# Patient Record
Sex: MALE | Race: WHITE | URBAN - METROPOLITAN AREA
[De-identification: names, ages, dates, MRNs, and addresses within clinical notes are randomized per-mention and may not be internally consistent; named-entity substitution may affect disease eponyms.]

---

## 2017-04-28 ENCOUNTER — INPATIENT (INPATIENT)
Facility: HOSPITAL | Age: 82
LOS: 2 days | Discharge: HOME HEALTH | End: 2017-05-01
Attending: SURGERY

## 2017-05-23 ENCOUNTER — APPOINTMENT (OUTPATIENT)
Dept: SURGERY | Facility: CLINIC | Age: 82
End: 2017-05-23

## 2017-06-28 DIAGNOSIS — R06.89 OTHER ABNORMALITIES OF BREATHING: ICD-10-CM

## 2017-06-28 DIAGNOSIS — S22.41XA MULTIPLE FRACTURES OF RIBS, RIGHT SIDE, INITIAL ENCOUNTER FOR CLOSED FRACTURE: ICD-10-CM

## 2017-06-28 DIAGNOSIS — I25.10 ATHEROSCLEROTIC HEART DISEASE OF NATIVE CORONARY ARTERY WITHOUT ANGINA PECTORIS: ICD-10-CM
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
## 2017-06-28 DIAGNOSIS — E78.5 HYPERLIPIDEMIA, UNSPECIFIED: ICD-10-CM

## 2017-06-28 DIAGNOSIS — N40.0 BENIGN PROSTATIC HYPERPLASIA WITHOUT LOWER URINARY TRACT SYMPTOMS: ICD-10-CM

## 2017-06-28 DIAGNOSIS — Z95.5 PRESENCE OF CORONARY ANGIOPLASTY IMPLANT AND GRAFT: ICD-10-CM
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
## 2017-06-28 DIAGNOSIS — I10 ESSENTIAL (PRIMARY) HYPERTENSION: ICD-10-CM
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
## 2017-06-28 DIAGNOSIS — Z85.21 PERSONAL HISTORY OF MALIGNANT NEOPLASM OF LARYNX: ICD-10-CM

## 2017-06-28 DIAGNOSIS — S22.49XA MULTIPLE FRACTURES OF RIBS, UNSPECIFIED SIDE, INITIAL ENCOUNTER FOR CLOSED FRACTURE: ICD-10-CM

## 2017-06-28 DIAGNOSIS — R00.1 BRADYCARDIA, UNSPECIFIED: ICD-10-CM

## 2017-12-29 ENCOUNTER — EMERGENCY (EMERGENCY)
Facility: HOSPITAL | Age: 82
LOS: 1 days | Discharge: LEFT BEFORE DISCHARGE | End: 2017-12-29

## 2017-12-29 DIAGNOSIS — R05 COUGH: ICD-10-CM

## 2017-12-29 DIAGNOSIS — R09.89 OTHER SPECIFIED SYMPTOMS AND SIGNS INVOLVING THE CIRCULATORY AND RESPIRATORY SYSTEMS: ICD-10-CM

## 2017-12-30 ENCOUNTER — TRANSCRIPTION ENCOUNTER (OUTPATIENT)
Age: 82
End: 2017-12-30

## 2019-09-04 ENCOUNTER — APPOINTMENT (OUTPATIENT)
Dept: CARDIOLOGY | Facility: CLINIC | Age: 84
End: 2019-09-04
Payer: MEDICARE

## 2019-09-04 PROCEDURE — 99214 OFFICE O/P EST MOD 30 MIN: CPT | Mod: 25

## 2019-09-04 PROCEDURE — 93000 ELECTROCARDIOGRAM COMPLETE: CPT

## 2020-03-11 ENCOUNTER — APPOINTMENT (OUTPATIENT)
Dept: CARDIOLOGY | Facility: CLINIC | Age: 85
End: 2020-03-11
Payer: MEDICARE

## 2020-03-11 PROCEDURE — 93000 ELECTROCARDIOGRAM COMPLETE: CPT

## 2020-03-11 PROCEDURE — 99214 OFFICE O/P EST MOD 30 MIN: CPT

## 2020-08-31 ENCOUNTER — RECORD ABSTRACTING (OUTPATIENT)
Age: 85
End: 2020-08-31

## 2020-08-31 DIAGNOSIS — Z87.891 PERSONAL HISTORY OF NICOTINE DEPENDENCE: ICD-10-CM

## 2020-08-31 DIAGNOSIS — I34.9 NONRHEUMATIC MITRAL VALVE DISORDER, UNSPECIFIED: ICD-10-CM

## 2020-08-31 DIAGNOSIS — I44.30 UNSPECIFIED ATRIOVENTRICULAR BLOCK: ICD-10-CM

## 2020-08-31 DIAGNOSIS — Z87.898 PERSONAL HISTORY OF OTHER SPECIFIED CONDITIONS: ICD-10-CM

## 2020-08-31 DIAGNOSIS — E11.9 TYPE 2 DIABETES MELLITUS W/OUT COMPLICATIONS: ICD-10-CM

## 2020-08-31 RX ORDER — GLIPIZIDE 2.5 MG/1
2.5 TABLET, FILM COATED, EXTENDED RELEASE ORAL
Refills: 0 | Status: ACTIVE | COMMUNITY

## 2020-09-15 NOTE — ASSESSMENT
[FreeTextEntry1] : S/P stent RCA due to MI 2001\par Recath 08 stable stent in RCA, 50% CX\par NIDDM\par Primary AV block\par asymptomatic sinus veronica\par Mild AI\par HTN\par MR/TR elevated PSAP 50\par Hypercholeterolemia\par Nl EF\par afib today willstart eliquis and get labs BMP and T4 TSH back in [facility](PAF) \par Plan	echo nov showed less MR and normal PSAP\[facility]\par lower beta since HR 37 asymptomatic 25 q12\par rico escalante on hospice and son in law know renata

## 2020-09-16 ENCOUNTER — APPOINTMENT (OUTPATIENT)
Dept: CARDIOLOGY | Facility: CLINIC | Age: 85
End: 2020-09-16

## 2021-01-05 ENCOUNTER — EMERGENCY (EMERGENCY)
Facility: HOSPITAL | Age: 86
LOS: 0 days | Discharge: HOME | End: 2021-01-05
Attending: EMERGENCY MEDICINE | Admitting: EMERGENCY MEDICINE
Payer: MEDICARE

## 2021-01-05 VITALS
SYSTOLIC BLOOD PRESSURE: 152 MMHG | OXYGEN SATURATION: 99 % | HEART RATE: 62 BPM | DIASTOLIC BLOOD PRESSURE: 81 MMHG | RESPIRATION RATE: 18 BRPM

## 2021-01-05 VITALS
RESPIRATION RATE: 17 BRPM | OXYGEN SATURATION: 100 % | SYSTOLIC BLOOD PRESSURE: 161 MMHG | HEART RATE: 64 BPM | TEMPERATURE: 97 F | WEIGHT: 199.96 LBS | DIASTOLIC BLOOD PRESSURE: 104 MMHG
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
## 2021-01-05 DIAGNOSIS — S09.90XA UNSPECIFIED INJURY OF HEAD, INITIAL ENCOUNTER: ICD-10-CM
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
## 2021-01-05 DIAGNOSIS — S00.81XA ABRASION OF OTHER PART OF HEAD, INITIAL ENCOUNTER: ICD-10-CM

## 2021-01-05 DIAGNOSIS — Z87.891 PERSONAL HISTORY OF NICOTINE DEPENDENCE: ICD-10-CM
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
## 2021-01-05 LAB
ALBUMIN SERPL ELPH-MCNC: 4.2 G/DL — SIGNIFICANT CHANGE UP (ref 3.5–5.2)
ALP SERPL-CCNC: 81 U/L — SIGNIFICANT CHANGE UP (ref 30–115)
ALT FLD-CCNC: 22 U/L — SIGNIFICANT CHANGE UP (ref 0–41)
ANION GAP SERPL CALC-SCNC: 16 MMOL/L — HIGH (ref 7–14)
APTT BLD: 33.3 SEC — SIGNIFICANT CHANGE UP (ref 27–39.2)
AST SERPL-CCNC: 23 U/L — SIGNIFICANT CHANGE UP (ref 0–41)
BASOPHILS # BLD AUTO: 0.01 K/UL — SIGNIFICANT CHANGE UP (ref 0–0.2)
BASOPHILS NFR BLD AUTO: 0.1 % — SIGNIFICANT CHANGE UP (ref 0–1)
BILIRUB SERPL-MCNC: 0.6 MG/DL — SIGNIFICANT CHANGE UP (ref 0.2–1.2)
BLD GP AB SCN SERPL QL: SIGNIFICANT CHANGE UP
BUN SERPL-MCNC: 30 MG/DL — HIGH (ref 10–20)
CALCIUM SERPL-MCNC: 8.7 MG/DL — SIGNIFICANT CHANGE UP (ref 8.5–10.1)
CHLORIDE SERPL-SCNC: 101 MMOL/L — SIGNIFICANT CHANGE UP (ref 98–110)
CO2 SERPL-SCNC: 23 MMOL/L — SIGNIFICANT CHANGE UP (ref 17–32)
CREAT SERPL-MCNC: 1.1 MG/DL — SIGNIFICANT CHANGE UP (ref 0.7–1.5)
EOSINOPHIL # BLD AUTO: 0.02 K/UL — SIGNIFICANT CHANGE UP (ref 0–0.7)
EOSINOPHIL NFR BLD AUTO: 0.2 % — SIGNIFICANT CHANGE UP (ref 0–8)
ETHANOL SERPL-MCNC: <10 MG/DL — SIGNIFICANT CHANGE UP
GLUCOSE SERPL-MCNC: 200 MG/DL — HIGH (ref 70–99)
HCT VFR BLD CALC: 42.6 % — SIGNIFICANT CHANGE UP (ref 42–52)
HGB BLD-MCNC: 14 G/DL — SIGNIFICANT CHANGE UP (ref 14–18)
IMM GRANULOCYTES NFR BLD AUTO: 0.3 % — SIGNIFICANT CHANGE UP (ref 0.1–0.3)
INR BLD: 1.52 RATIO — HIGH (ref 0.65–1.3)
LACTATE SERPL-SCNC: 1.6 MMOL/L — SIGNIFICANT CHANGE UP (ref 0.7–2)
LACTATE SERPL-SCNC: 3.1 MMOL/L — HIGH (ref 0.7–2)
LYMPHOCYTES # BLD AUTO: 1.73 K/UL — SIGNIFICANT CHANGE UP (ref 1.2–3.4)
LYMPHOCYTES # BLD AUTO: 17.8 % — LOW (ref 20.5–51.1)
MCHC RBC-ENTMCNC: 27 PG — SIGNIFICANT CHANGE UP (ref 27–31)
MCHC RBC-ENTMCNC: 32.9 G/DL — SIGNIFICANT CHANGE UP (ref 32–37)
MCV RBC AUTO: 82.1 FL — SIGNIFICANT CHANGE UP (ref 80–94)
MONOCYTES # BLD AUTO: 0.62 K/UL — HIGH (ref 0.1–0.6)
MONOCYTES NFR BLD AUTO: 6.4 % — SIGNIFICANT CHANGE UP (ref 1.7–9.3)
NEUTROPHILS # BLD AUTO: 7.31 K/UL — HIGH (ref 1.4–6.5)
NEUTROPHILS NFR BLD AUTO: 75.2 % — SIGNIFICANT CHANGE UP (ref 42.2–75.2)
NRBC # BLD: 0 /100 WBCS — SIGNIFICANT CHANGE UP (ref 0–0)
PLATELET # BLD AUTO: 146 K/UL — SIGNIFICANT CHANGE UP (ref 130–400)
POTASSIUM SERPL-MCNC: 4.3 MMOL/L — SIGNIFICANT CHANGE UP (ref 3.5–5)
POTASSIUM SERPL-SCNC: 4.3 MMOL/L — SIGNIFICANT CHANGE UP (ref 3.5–5)
PROT SERPL-MCNC: 6.9 G/DL — SIGNIFICANT CHANGE UP (ref 6–8)
PROTHROM AB SERPL-ACNC: 17.5 SEC — HIGH (ref 9.95–12.87)
RBC # BLD: 5.19 M/UL — SIGNIFICANT CHANGE UP (ref 4.7–6.1)
RBC # FLD: 13 % — SIGNIFICANT CHANGE UP (ref 11.5–14.5)
SODIUM SERPL-SCNC: 140 MMOL/L — SIGNIFICANT CHANGE UP (ref 135–146)
TROPONIN T SERPL-MCNC: <0.01 NG/ML — SIGNIFICANT CHANGE UP
WBC # BLD: 9.72 K/UL — SIGNIFICANT CHANGE UP (ref 4.8–10.8)
WBC # FLD AUTO: 9.72 K/UL — SIGNIFICANT CHANGE UP (ref 4.8–10.8)

## 2021-01-05 PROCEDURE — 71045 X-RAY EXAM CHEST 1 VIEW: CPT | Mod: 26

## 2021-01-05 PROCEDURE — 74177 CT ABD & PELVIS W/CONTRAST: CPT | Mod: 26

## 2021-01-05 PROCEDURE — 72170 X-RAY EXAM OF PELVIS: CPT | Mod: 26

## 2021-01-05 PROCEDURE — 99285 EMERGENCY DEPT VISIT HI MDM: CPT

## 2021-01-05 PROCEDURE — 70450 CT HEAD/BRAIN W/O DYE: CPT | Mod: 26

## 2021-01-05 PROCEDURE — 72125 CT NECK SPINE W/O DYE: CPT | Mod: 26

## 2021-01-05 PROCEDURE — 71260 CT THORAX DX C+: CPT | Mod: 26

## 2021-01-05 PROCEDURE — 93010 ELECTROCARDIOGRAM REPORT: CPT

## 2021-01-05 NOTE — ED PROVIDER NOTE - CLINICAL SUMMARY MEDICAL DECISION MAKING FREE TEXT BOX
89M presents after a mechanical fall in the AM and Head injury. Denies LOC. vs-htn. concern for intracranial trauma- trauma alerted. Placed in collar. On exam with scalp laceration- skin tear with dried blood. taken to ct- CTH neg for ich, midline shift, or hydrocephalus/ scalp hematoma. Remainder of trauma scans neg for trauma. Labs reviewed by me, noted to have elevated lactate that cleared. ED CXR reviewed by me which did not reveal a ptx, infiltrate, or effusion. ED Pelvis portable XR negative for fx's or dislocations. Pt on reeval- ambulatory with no gait dysfunction. Cleared by trauma pt is s/p fall from AC > 6 hours. DC home with pmd fu. return precautions given, and advised pt on delayed bleed. Family in the wr took pt home.

## 2021-01-05 NOTE — ED PROVIDER NOTE - NSFOLLOWUPINSTRUCTIONS_ED_ALL_ED_FT
Follow up with PMD and GI in 1-2 days.    Closed Head Injury    Closed head injury in an injury to your head that may or may not involve a traumatic brain injury (TBI). Symptoms of TBI can be short or long lasting and include headache, dizziness, interference with memory or speech, fatigue, confusion, changes in sleep, mood changes, nausea, depression/anxiety, and dulling of senses. Make sure to obtain proper rest which includes getting plenty of sleep, avoiding excessive visual stimulation, and avoiding activities that may cause physical or mental stress. Avoid any situation where there is potential for another head injury including sports.    SEEK MEDICAL CARE IF YOU HAVE THE FOLLOWING SYMPTOMS: unusual drowsiness, vomiting, severe dizziness, seizures, lightheadedness, muscular weakness, different pupil sizes, visual changes, or clear or bloody discharge from your ears or nose.

## 2021-01-05 NOTE — ED PROVIDER NOTE - OBJECTIVE STATEMENT
89y M pmh htn, afib on eliquis, throat CA in remission s/p resection presents for eval s/p fall. Pt states he was drinking water when he started to choke and tripped falling forward hitting his jamaal on the ground sustaining an abrasion. Now presents with mild stinging pain localized to abrasion. Denies loc, change in vision, ha, cp, sob, cough, weakness, numbness, hematuria, n/v

## 2021-01-05 NOTE — ED PROVIDER NOTE - PHYSICAL EXAMINATION
CONST: NAD  EYES: Sclera and conjunctiva clear.   ENT: No nasal discharge. Oropharynx normal appearing  NECK: Non-tender, no meningeal signs. normal ROM. supple   CARD: S1 S2; No jvd  RESP: Equal BS B/L, No wheezes, rhonchi or rales. No distress  GI: Soft, non-tender, non-distended. no cva tenderness. normal BS  MS: Normal ROM in all extremities. pulses 2 +. no calf tenderness or swelling  SKIN: Superficial skin abrasion and tear to forehead  NEURO: A&Ox3, No focal deficits. Strength 5/5 with no sensory deficits.

## 2021-01-05 NOTE — ED PROVIDER NOTE - PATIENT PORTAL LINK FT
You can access the FollowMyHealth Patient Portal offered by [facility] by registering at the following website: [email or web address]. By joining [facility]’s FollowMyHealth portal, you will also be able to view your health information using other applications (apps) compatible with our system.

## 2021-01-05 NOTE — ED ADULT NURSE NOTE - CHIEF COMPLAINT QUOTE
Patient brought in for head injury s/p fall this morning. Patient states he tripped and fell this morning fell on tile floor. Laceration to top of head. Patient on eliquis. Trauma alert called in triage.    [phone] bj son emergency contact

## 2021-01-05 NOTE — ED ADULT TRIAGE NOTE - CHIEF COMPLAINT QUOTE
Patient brought in for head injury s/p fall this morning. Patient states he tripped and fell this morning fell on tile floor. Laceration to top of head. Patient on eliquis. Trauma alert called in triage.    [phone] bj son emergency contact

## 2021-01-05 NOTE — ED ADULT NURSE NOTE - OBJECTIVE STATEMENT
pt presents to ED brought in by sons for head laceration. Pt sneezed and hit head, bleed noted, pt on coumadin

## 2021-01-05 NOTE — ED PROVIDER NOTE - CARE PROVIDER_API CALL
Bailee Mejia (MD)  Gastroenterology  [address] [place] [address]  Phone: [phone]  Fax: [phone]  Follow Up Time:

## 2021-01-05 NOTE — CONSULT NOTE ADULT - SUBJECTIVE AND OBJECTIVE BOX
TRAUMA ACTIVATION LEVEL:      MECHANISM OF INJURY:   [] Blunt     [] MVC	  [x] Fall	  [] Pedestrian Struck	  [] Motorcycle     [] Assault     [] Bicycle collision    [] Sports injury    [] Penetrating    [] Gun Shot Wound      [] Stab Wound    GCS: 15 	E: 4	V: 5	M: 6    HPI:  89yM w/ PMHx of HTN, & throat cancer s/p sx & radiation, DM, s/p PCI w/ stentx1 seen as Trauma Alert s/p fall, + HT, -LOC, on eliquis. Trauma assessment in ED: ABCs intact , GCS 15 , AAOx3. Patient states he was drinking water, choked and started coughing which caused him to lose his footing and fall.  Son mentions he has fallen before, 4 y/a with a similar episode.    PAST MEDICAL & SURGICAL HISTORY:      Allergies  Allergy Status Unknown  Intolerances      Home Medications:      ROS: 10-system review is otherwise negative except HPI above.      Primary Survey:    A - airway intact  B - bilateral breath sounds and good chest rise  C - palpable pulses in all extremities  D - GCS 15 on arrival, GALLO  Exposure obtained    Vital Signs Last 24 Hrs  T(C): 36.3 (05 Jan 2021 18:36), Max: 36.3 (05 Jan 2021 18:36)  T(F): 97.4 (05 Jan 2021 18:36), Max: 97.4 (05 Jan 2021 18:36)  HR: 64 (05 Jan 2021 18:36) (64 - 64)  BP: 161/104 (05 Jan 2021 18:36) (161/104 - 161/104)  BP(mean): --  RR: 17 (05 Jan 2021 18:36) (17 - 17)  SpO2: 100% (05 Jan 2021 18:36) (100% - 100%)    Secondary Survey:   General: NAD  HEENT: Normocephalic, atraumatic, L forehead abrasion with small hematoma.   Neck: Soft, midline trachea. no c-spine tenderness  Chest: No chest wall tenderness, no subcutaneous emphysema   Cardiac: S1, S2, RRR  Respiratory: Bilateral breath sounds, clear and equal bilaterally  Abdomen: Soft, non-distended, non-tender, no rebound, no guarding.  Groin: Normal appearing, pelvis stable   Ext:Ext:  Moving b/l upper and lower extremities  Back: No T/L/S spine tenderness, No palpable runoff/stepoff/deformity      FAST: Negative    Labs:  CAPILLARY BLOOD GLUCOSE      POCT Blood Glucose.: 187 mg/dL (05 Jan 2021 18:49)        Coags:     17.50  ----< 1.52    ( 05 Jan 2021 19:03 )     x             RADIOLOGY & ADDITIONAL STUDIES:  pending completion     TRAUMA ACTIVATION LEVEL:      MECHANISM OF INJURY:   [] Blunt     [] MVC	  [x] Fall	  [] Pedestrian Struck	  [] Motorcycle     [] Assault     [] Bicycle collision    [] Sports injury    [] Penetrating    [] Gun Shot Wound      [] Stab Wound    GCS: 15 	E: 4	V: 5	M: 6    HPI:  89yM w/ PMHx of HTN, & throat cancer s/p sx & radiation, DM, s/p PCI w/ stentx1 seen as Trauma Alert s/p fall, + HT, -LOC, on eliquis. Trauma assessment in ED: ABCs intact , GCS 15 , AAOx3. Patient states he was drinking water, choked and started coughing which caused him to lose his footing and fall.  Son mentions he has fallen before, 4 y/a with a similar episode.    PAST MEDICAL & SURGICAL HISTORY:      Allergies  Allergy Status Unknown  Intolerances      Home Medications:      ROS: 10-system review is otherwise negative except HPI above.      Primary Survey:    A - airway intact  B - bilateral breath sounds and good chest rise  C - palpable pulses in all extremities  D - GCS 15 on arrival, GALLO  Exposure obtained    Vital Signs Last 24 Hrs  T(C): 36.3 (05 Jan 2021 18:36), Max: 36.3 (05 Jan 2021 18:36)  T(F): 97.4 (05 Jan 2021 18:36), Max: 97.4 (05 Jan 2021 18:36)  HR: 64 (05 Jan 2021 18:36) (64 - 64)  BP: 161/104 (05 Jan 2021 18:36) (161/104 - 161/104)  BP(mean): --  RR: 17 (05 Jan 2021 18:36) (17 - 17)  SpO2: 100% (05 Jan 2021 18:36) (100% - 100%)    Secondary Survey:   General: NAD  HEENT: Normocephalic, atraumatic, L forehead abrasion with small hematoma.   Neck: Soft, midline trachea. no c-spine tenderness  Chest: No chest wall tenderness, no subcutaneous emphysema   Cardiac: S1, S2, RRR  Respiratory: Bilateral breath sounds, clear and equal bilaterally  Abdomen: Soft, non-distended, non-tender, no rebound, no guarding.  Groin: Normal appearing, pelvis stable   Ext:Ext:  Moving b/l upper and lower extremities  Back: No T/L/S spine tenderness, No palpable runoff/stepoff/deformity      FAST: Negative    Labs:  CAPILLARY BLOOD GLUCOSE      POCT Blood Glucose.: 187 mg/dL (05 Jan 2021 18:49)                          14.0   9.72  )-----------( 146      ( 05 Jan 2021 19:03 )             42.6   01-05    140  |  101  |  30<H>  ----------------------------<  200<H>  4.3   |  23  |  1.1    Ca    8.7      05 Jan 2021 19:03    TPro  6.9  /  Alb  4.2  /  TBili  0.6  /  DBili  x   /  AST  23  /  ALT  22  /  AlkPhos  81  01-05      Coags:     17.50  ----< 1.52    ( 05 Jan 2021 19:03 )     x             RADIOLOGY & ADDITIONAL STUDIES:      < from: CT Abdomen and Pelvis w/ IV Cont (01.05.21 @ 20:24) >      1. There is a 2.3 cm low-density rounded mass in the region between the uncinate process of the pancreas and the superior aspect of the third portion of the duodenum. While this may represent a duodenal diverticulum, the possibility of a neoplasm arising from the uncinate process cannot be ruled out. A non-emergent MRI may be obtained for further evaluation.    2. No evidence of acute intra-thoracic, abdominal or pelvic injury or hematoma.    3. No acute fractures are identified.    < end of copied text >  < from: CT Head No Cont (01.05.21 @ 20:15) >  No CT evidence for acute intracranial pathology.    No CT evidence for acute cervical spine fracture.    Left frontal extracalvarial soft tissue swelling/hematoma.  Multilevel moderate to severe degenerative changes of the cervical spine.    < end of copied text >   TRAUMA ACTIVATION LEVEL:  2, alert    MECHANISM OF INJURY:   [] Blunt     [] MVC	  [x] Fall	  [] Pedestrian Struck	  [] Motorcycle     [] Assault     [] Bicycle collision    [] Sports injury    [] Penetrating    [] Gun Shot Wound      [] Stab Wound    GCS: 15 	E: 4	V: 5	M: 6    HPI:  89yM w/ PMHx of HTN, & throat cancer s/p sx & radiation, DM, s/p PCI w/ stentx1 seen as Trauma Alert s/p fall, + HT, -LOC, on eliquis. Patient states he was drinking water, choked and started coughing which caused him to lose his footing and fall.  Son mentions he has fallen before, 4 y/a with a similar episode. Pt c/o mild forehead pain. Pt denies pain to neck, chest, abdomen, extremities. Pt denies CP, SOB, n/v/d, fever, chills, urinary symptoms.    Trauma assessment in ED: ABCs intact , GCS 15 , AAOx3.     PAST MEDICAL & SURGICAL HISTORY:    Allergies  Allergy Status Unknown  Intolerances    Home Medications:      ROS: 10-system review is otherwise negative except HPI above.      Primary Survey:    A - airway intact  B - bilateral breath sounds and good chest rise  C - palpable pulses in all extremities  D - GCS 15 on arrival, GALLO  Exposure obtained    Vital Signs Last 24 Hrs  T(C): 36.3 (05 Jan 2021 18:36), Max: 36.3 (05 Jan 2021 18:36)  T(F): 97.4 (05 Jan 2021 18:36), Max: 97.4 (05 Jan 2021 18:36)  HR: 64 (05 Jan 2021 18:36) (64 - 64)  BP: 161/104 (05 Jan 2021 18:36) (161/104 - 161/104)  BP(mean): --  RR: 17 (05 Jan 2021 18:36) (17 - 17)  SpO2: 100% (05 Jan 2021 18:36) (100% - 100%)    Secondary Survey:   General: NAD  HEENT: Normocephalic, atraumatic, L forehead small hematoma w/ overlying skin tear  Neck: Soft, midline trachea. no c-spine tenderness  Chest: No chest wall tenderness, no subcutaneous emphysema   Cardiac: S1, S2, RRR  Respiratory: Bilateral breath sounds, clear and equal bilaterally  Abdomen: Soft, non-distended, non-tender, no rebound, no guarding.  Groin: Normal appearing, pelvis stable   Ext:Ext:  Moving b/l upper and lower extremities  Back: No T/L/S spine tenderness, No palpable runoff/stepoff/deformity      FAST: Negative    Labs:  CAPILLARY BLOOD GLUCOSE      POCT Blood Glucose.: 187 mg/dL (05 Jan 2021 18:49)                          14.0   9.72  )-----------( 146      ( 05 Jan 2021 19:03 )             42.6   01-05    140  |  101  |  30<H>  ----------------------------<  200<H>  4.3   |  23  |  1.1    Ca    8.7      05 Jan 2021 19:03    TPro  6.9  /  Alb  4.2  /  TBili  0.6  /  DBili  x   /  AST  23  /  ALT  22  /  AlkPhos  81  01-05      Coags:     17.50  ----< 1.52    ( 05 Jan 2021 19:03 )     x             RADIOLOGY & ADDITIONAL STUDIES:      < from: CT Abdomen and Pelvis w/ IV Cont (01.05.21 @ 20:24) >      1. There is a 2.3 cm low-density rounded mass in the region between the uncinate process of the pancreas and the superior aspect of the third portion of the duodenum. While this may represent a duodenal diverticulum, the possibility of a neoplasm arising from the uncinate process cannot be ruled out. A non-emergent MRI may be obtained for further evaluation.    2. No evidence of acute intra-thoracic, abdominal or pelvic injury or hematoma.    3. No acute fractures are identified.    < end of copied text >  < from: CT Head No Cont (01.05.21 @ 20:15) >  No CT evidence for acute intracranial pathology.    No CT evidence for acute cervical spine fracture.    Left frontal extracalvarial soft tissue swelling/hematoma.  Multilevel moderate to severe degenerative changes of the cervical spine.    < end of copied text >

## 2021-01-05 NOTE — ED PROVIDER NOTE - NS ED ROS FT
Constitutional: (-) fever  Eyes/ENT: (-) blurry vision, (-) epistaxis  Cardiovascular: (-) chest pain, (-) syncope  Respiratory: (-) cough, (-) shortness of breath  Gastrointestinal: (-) vomiting, (-) diarrhea\  : (-) dysuria, (-) hematuria  Musculoskeletal: (-) neck pain, (-) back pain, (-) joint pain  Integumentary: (+) abrasion, (-) rash, (-) edema  Neurological: (-) headache, (-) altered mental status  Allergic/Immunologic: (-) pruritus

## 2021-01-05 NOTE — CONSULT NOTE ADULT - ASSESSMENT
ASSESSMENT:  89yM w/ PMHx of HTN, & throat cancer s/p sx & radiation, DM, s/p PCI w/ stentx1 seen as Trauma Alert s/p fall, + HT, -LOC, on eliquis found to have L forehead abrasion with hematoma. No other signs of injury seen. will be pan scanned    PLAN:   - Trauma Labs: (CBC, BMP, Coags, T&S, UA, EtOH level)  - CXR, Pelvic Xray  - CT Head,  CT C-spine, CT Max/Face, CT Chest, CT Abd/Pelvis      Disposition pending results of above labs and imaging  Above plan discussed with Trauma attending, Dr. Gardner, patient, patient family, and ED team   ASSESSMENT:  89yM w/ PMHx of HTN, & throat cancer s/p sx & radiation, DM, s/p PCI w/ stentx1 seen as Trauma Alert s/p fall, + HT, -LOC, on eliquis found to have L forehead abrasion with hematoma. No other signs of injury seen. will be pan scanned    PLAN:   PAN scan is negative.  cleared from trauma surgery perspective.  Dispo per ED    Above plan discussed with Trauma attending, Dr. Gardner, patient, patient family, and ED team   ASSESSMENT:  89yM w/ PMHx of HTN, & throat cancer s/p sx & radiation, DM, s/p PCI w/ stentx1 seen as Trauma Alert s/p fall, + HT, -LOC, on eliquis found to have L forehead abrasion with hematoma. No other signs of injury seen. will be pan scanned    PLAN:   PAN scan is negative.  cleared from trauma surgery perspective.  Dispo per ED  Above plan discussed with Trauma attending, Dr. Gardner, patient, patient family, and ED team    Senior Note  I have personally examined and evaluated the patient  I agree with the above plan and note, and I have edited where appropriate  External signs of injury on exam: L forehead extracalvarial hematoma w/ overlying skin tear  Images reviewed, as above. No acute traumatic findings  Dispo as per ED  Surgical Attending aware and agrees with plan

## 2021-05-27 PROBLEM — I10 ESSENTIAL (PRIMARY) HYPERTENSION: Chronic | Status: ACTIVE | Noted: 2021-01-05

## 2021-05-27 PROBLEM — I48.91 UNSPECIFIED ATRIAL FIBRILLATION: Chronic | Status: ACTIVE | Noted: 2021-01-05

## 2021-10-11 ENCOUNTER — RESULT CHARGE (OUTPATIENT)
Age: 86
End: 2021-10-11

## 2021-10-11 ENCOUNTER — APPOINTMENT (OUTPATIENT)
Dept: CARDIOLOGY | Facility: CLINIC | Age: 86
End: 2021-10-11
Payer: MEDICARE

## 2021-10-11 VITALS
TEMPERATURE: 98 F | BODY MASS INDEX: 33.49 KG/M2 | SYSTOLIC BLOOD PRESSURE: 140 MMHG | HEIGHT: 62 IN | DIASTOLIC BLOOD PRESSURE: 60 MMHG | OXYGEN SATURATION: 95 % | WEIGHT: 182 LBS | HEART RATE: 47 BPM

## 2021-10-11 DIAGNOSIS — Z00.00 ENCOUNTER FOR GENERAL ADULT MEDICAL EXAMINATION W/OUT ABNORMAL FINDINGS: ICD-10-CM

## 2021-10-11 PROCEDURE — 99213 OFFICE O/P EST LOW 20 MIN: CPT

## 2021-10-11 PROCEDURE — 93000 ELECTROCARDIOGRAM COMPLETE: CPT

## 2021-10-11 RX ORDER — HYDROCHLOROTHIAZIDE 12.5 MG/1
12.5 CAPSULE ORAL DAILY
Refills: 0 | Status: ACTIVE | COMMUNITY

## 2021-10-11 RX ORDER — FOLIC ACID 1 MG/1
1 TABLET ORAL DAILY
Refills: 0 | Status: ACTIVE | COMMUNITY

## 2021-10-11 RX ORDER — FUROSEMIDE 20 MG/1
20 TABLET ORAL DAILY
Refills: 0 | Status: ACTIVE | COMMUNITY

## 2021-10-11 RX ORDER — TERAZOSIN 5 MG/1
5 CAPSULE ORAL
Refills: 0 | Status: ACTIVE | COMMUNITY

## 2021-10-11 RX ORDER — FINASTERIDE 5 MG/1
5 TABLET, FILM COATED ORAL DAILY
Refills: 0 | Status: ACTIVE | COMMUNITY

## 2021-10-11 RX ORDER — PAROXETINE HYDROCHLORIDE 40 MG/1
40 TABLET, FILM COATED ORAL
Refills: 0 | Status: ACTIVE | COMMUNITY

## 2021-10-11 RX ORDER — LOSARTAN POTASSIUM 25 MG/1
25 TABLET, FILM COATED ORAL
Qty: 90 | Refills: 2 | Status: ACTIVE | COMMUNITY

## 2021-10-11 NOTE — ASSESSMENT
[FreeTextEntry1] : S/P stent RCA due to MI 2001\par Recath 08 stable stent in RCA, 50% CX\par NIDDM\par Primary AV block\par asymptomatic sinus veronica\par Mild AI\par HTN\par MR/TR elevated PSAP 50\par Hypercholeterolemia\par Nl EF\par afib today willstart eliquis and get labs BMP and T4 TSH back in [facility]sr(PAF) \par echo nov showed less MR and normal PSAP\par [facility]\par lower beta since HR 47 still veronica will cut metoproolol to 1/2 pill once a day (25 a day) \par Rose ava on hospice and son in law know Marcus pruittic

## 2021-10-11 NOTE — PHYSICAL EXAM
[Normal Conjunctiva] : normal conjunctiva [Normal Venous Pressure] : normal venous pressure [No Carotid Bruit] : no carotid bruit [Normal S1, S2] : normal S1, S2 [No Murmur] : no murmur [No Rub] : no rub [No Gallop] : no gallop [Clear Lung Fields] : clear lung fields [Soft] : abdomen soft [No Masses/organomegaly] : no masses/organomegaly [Non Tender] : non-tender [Normal Bowel Sounds] : normal bowel sounds [No Edema] : no edema [No Cyanosis] : no cyanosis [No Clubbing] : no clubbing [Normal PT B/L] : normal PT B/L [Alert and Oriented] : alert and oriented

## 2022-01-01 ENCOUNTER — APPOINTMENT (OUTPATIENT)
Dept: CARDIOLOGY | Facility: CLINIC | Age: 87
End: 2022-01-01

## 2022-01-01 ENCOUNTER — APPOINTMENT (OUTPATIENT)
Dept: CARDIOLOGY | Facility: CLINIC | Age: 87
End: 2022-01-01
Payer: MEDICARE

## 2022-01-01 ENCOUNTER — NON-APPOINTMENT (OUTPATIENT)
Age: 87
End: 2022-01-01

## 2022-01-01 ENCOUNTER — APPOINTMENT (OUTPATIENT)
Age: 87
End: 2022-01-01

## 2022-01-01 VITALS
WEIGHT: 183 LBS | SYSTOLIC BLOOD PRESSURE: 190 MMHG | HEIGHT: 62 IN | HEART RATE: 76 BPM | DIASTOLIC BLOOD PRESSURE: 82 MMHG | OXYGEN SATURATION: 96 % | BODY MASS INDEX: 33.68 KG/M2

## 2022-01-01 VITALS
TEMPERATURE: 97.6 F | SYSTOLIC BLOOD PRESSURE: 170 MMHG | HEIGHT: 62 IN | OXYGEN SATURATION: 96 % | BODY MASS INDEX: 34.23 KG/M2 | HEART RATE: 72 BPM | DIASTOLIC BLOOD PRESSURE: 90 MMHG | WEIGHT: 186 LBS | RESPIRATION RATE: 15 BRPM

## 2022-01-01 VITALS
WEIGHT: 184.8 LBS | BODY MASS INDEX: 34.01 KG/M2 | HEART RATE: 80 BPM | HEIGHT: 62 IN | SYSTOLIC BLOOD PRESSURE: 160 MMHG | DIASTOLIC BLOOD PRESSURE: 82 MMHG | OXYGEN SATURATION: 97 %

## 2022-01-01 VITALS
HEIGHT: 62 IN | WEIGHT: 183 LBS | DIASTOLIC BLOOD PRESSURE: 80 MMHG | TEMPERATURE: 97.7 F | BODY MASS INDEX: 33.68 KG/M2 | RESPIRATION RATE: 15 BRPM | SYSTOLIC BLOOD PRESSURE: 140 MMHG | HEART RATE: 59 BPM | OXYGEN SATURATION: 97 %

## 2022-01-01 VITALS — SYSTOLIC BLOOD PRESSURE: 170 MMHG | DIASTOLIC BLOOD PRESSURE: 80 MMHG

## 2022-01-01 DIAGNOSIS — R42 DIZZINESS AND GIDDINESS: ICD-10-CM

## 2022-01-01 DIAGNOSIS — I10 ESSENTIAL (PRIMARY) HYPERTENSION: ICD-10-CM

## 2022-01-01 DIAGNOSIS — I48.91 UNSPECIFIED ATRIAL FIBRILLATION: ICD-10-CM

## 2022-01-01 DIAGNOSIS — R00.1 BRADYCARDIA, UNSPECIFIED: ICD-10-CM

## 2022-01-01 DIAGNOSIS — E78.00 PURE HYPERCHOLESTEROLEMIA, UNSPECIFIED: ICD-10-CM

## 2022-01-01 DIAGNOSIS — I48.0 PAROXYSMAL ATRIAL FIBRILLATION: ICD-10-CM

## 2022-01-01 DIAGNOSIS — I25.10 ATHEROSCLEROTIC HEART DISEASE OF NATIVE CORONARY ARTERY W/OUT ANGINA PECTORIS: ICD-10-CM

## 2022-01-01 PROCEDURE — ZZZZZ: CPT

## 2022-01-01 PROCEDURE — 93000 ELECTROCARDIOGRAM COMPLETE: CPT

## 2022-01-01 PROCEDURE — 99213 OFFICE O/P EST LOW 20 MIN: CPT | Mod: 25

## 2022-01-01 PROCEDURE — 99204 OFFICE O/P NEW MOD 45 MIN: CPT

## 2022-01-01 PROCEDURE — 99214 OFFICE O/P EST MOD 30 MIN: CPT | Mod: 25

## 2022-01-01 PROCEDURE — 99213 OFFICE O/P EST LOW 20 MIN: CPT

## 2022-03-07 ENCOUNTER — APPOINTMENT (OUTPATIENT)
Dept: CARDIOLOGY | Facility: CLINIC | Age: 87
End: 2022-03-07
Payer: MEDICARE

## 2022-03-07 VITALS
BODY MASS INDEX: 33.34 KG/M2 | TEMPERATURE: 97.6 F | DIASTOLIC BLOOD PRESSURE: 72 MMHG | OXYGEN SATURATION: 96 % | WEIGHT: 181.2 LBS | SYSTOLIC BLOOD PRESSURE: 122 MMHG | HEIGHT: 62 IN | HEART RATE: 67 BPM

## 2022-03-07 DIAGNOSIS — I34.0 NONRHEUMATIC MITRAL (VALVE) INSUFFICIENCY: ICD-10-CM

## 2022-03-07 DIAGNOSIS — R09.89 OTHER SPECIFIED SYMPTOMS AND SIGNS INVOLVING THE CIRCULATORY AND RESPIRATORY SYSTEMS: ICD-10-CM

## 2022-03-07 PROCEDURE — 99214 OFFICE O/P EST MOD 30 MIN: CPT

## 2022-03-07 PROCEDURE — ZZZZZ: CPT

## 2022-03-07 PROCEDURE — 93000 ELECTROCARDIOGRAM COMPLETE: CPT

## 2022-03-07 RX ORDER — SIMVASTATIN 80 MG/1
80 TABLET, FILM COATED ORAL DAILY
Qty: 30 | Refills: 0 | Status: ACTIVE | COMMUNITY

## 2022-03-07 RX ORDER — APIXABAN 5 MG/1
5 TABLET, FILM COATED ORAL
Qty: 180 | Refills: 1 | Status: ACTIVE | COMMUNITY
Start: 2022-03-07

## 2022-03-07 RX ORDER — TEMAZEPAM 30 MG/1
30 CAPSULE ORAL
Refills: 0 | Status: ACTIVE | COMMUNITY

## 2022-03-11 NOTE — ASSESSMENT
[FreeTextEntry1] : Assessment:\par #Lightheaded with falls\par - EKG 3/7/22 SB with 1st degree AV block and PVCs\par - Conduction disease vs orthostatic hypotension vs polypharmacy\par - Polypharmacy? taking benzo at night to help with sleep\par #L carotid bruit\par #CAD\par #HTN\par #AFib\par - On apixaban 5 mg BID\par #DLD\par \par \par Plan:\par - Check TTE \par - Bilateral carotid duplex given L carotid bruit and lightheadedness\par - 30 day holter monitor (MCOT)\par - Continue metop tartrate 12.5 mg once daily, HCTZ 12.5 mg daily\par - Continue Lasix 20 mg daily\par - Continue apixaban\par - Continue Plavix, simva 40 mg daily\par - [facility]\par - RTC 3 months, depending on holter results, try to discontinue BB, HCTZ or ARB to help with polypharmacy\par

## 2022-03-11 NOTE — PHYSICAL EXAM
[Well Developed] : well developed [Well Nourished] : well nourished [Left Carotid Bruit] : left carotid bruit [Normal S1, S2] : normal S1, S2 [No Murmur] : no murmur [No Rub] : no rub [No Gallop] : no gallop [Clear Lung Fields] : clear lung fields [Good Air Entry] : good air entry [Soft] : abdomen soft [Non Tender] : non-tender [Moves all extremities] : moves all extremities [No Focal Deficits] : no focal deficits [No edema] : no edema [Present] : present bilaterally

## 2022-03-11 NOTE — REVIEW OF SYSTEMS
[Dizziness] : dizziness [Fever] : no fever [Headache] : no headache [Chills] : no chills [SOB] : no shortness of breath [Dyspnea on exertion] : not dyspnea during exertion [Chest Discomfort] : no chest discomfort [Lower Ext Edema] : no extremity edema [Leg Claudication] : no intermittent leg claudication [Palpitations] : no palpitations [Orthopnea] : no orthopnea [PND] : no PND [Syncope] : no syncope [Cough] : no cough [Abdominal Pain] : no abdominal pain [Joint Pain] : no joint pain [Rash] : no rash [Confusion] : no confusion was observed

## 2022-04-28 ENCOUNTER — NON-APPOINTMENT (OUTPATIENT)
Age: 87
End: 2022-04-28

## 2022-04-28 RX ORDER — CLOPIDOGREL 75 MG/1
75 TABLET, FILM COATED ORAL DAILY
Refills: 0 | Status: DISCONTINUED | COMMUNITY
End: 2022-04-28

## 2022-04-28 RX ORDER — ASPIRIN ENTERIC COATED TABLETS 81 MG 81 MG/1
81 TABLET, DELAYED RELEASE ORAL
Qty: 90 | Refills: 3 | Status: ACTIVE | COMMUNITY
Start: 2022-04-28

## 2022-04-28 RX ORDER — METOPROLOL TARTRATE 25 MG/1
25 TABLET, FILM COATED ORAL
Qty: 90 | Refills: 2 | Status: DISCONTINUED | COMMUNITY
End: 2022-04-28

## 2022-05-11 ENCOUNTER — APPOINTMENT (OUTPATIENT)
Dept: CARDIOLOGY | Facility: CLINIC | Age: 87
End: 2022-05-11
Payer: MEDICARE

## 2022-05-11 PROCEDURE — 93306 TTE W/DOPPLER COMPLETE: CPT

## 2022-05-11 PROCEDURE — 93880 EXTRACRANIAL BILAT STUDY: CPT

## 2022-06-20 PROBLEM — I48.91 UNSPECIFIED ATRIAL FIBRILLATION: Noted: 2020-08-31

## 2022-06-20 NOTE — DISCUSSION/SUMMARY
[FreeTextEntry1] : I have recommended an event monitor to further evaluate his symptoms to determine if the symptoms may be related to a cardiac arrhythmia.  I educated the patient about the patient symptom activator feature and I have asked him to activate the event monitor whenever he has symptoms. \par

## 2022-06-20 NOTE — PHYSICAL EXAM
[Well Developed] : well developed [Well Nourished] : well nourished [No Acute Distress] : no acute distress [Normal Conjunctiva] : normal conjunctiva [Normal Venous Pressure] : normal venous pressure [Normal S1, S2] : normal S1, S2 [No Murmur] : no murmur [Clear Lung Fields] : clear lung fields [Good Air Entry] : good air entry [No Respiratory Distress] : no respiratory distress  [Soft] : abdomen soft [Normal Gait] : normal gait [No Edema] : no edema [No Rash] : no rash [Moves all extremities] : moves all extremities [Normal Speech] : normal speech [Alert and Oriented] : alert and oriented [Normal memory] : normal memory [de-identified] : walks with rolling walker

## 2022-06-20 NOTE — HISTORY OF PRESENT ILLNESS
[FreeTextEntry1] : \par Cardiologist: Dr. Sinclair\par \par 89 yo M with CAD with MI s/p RCA ALLAN (2001), [facility] 2008 with stable RCA stent and 50% LCx, HTN, HL, DM, paroxysmal AF on Eliquis with two falls earlier this year. One fall was getting out of bed at night about to go to the bathroom (he did not open his eyes or turn on the light) and the second fall was in the shower, but he cannot recall the details. He walks with a rolling walker and is active. No dyspnea or chest pain when climbing up a flight of stairs or walking. Dizzy only when bending over. Metoprolol was stopped by Dr. Sinclair 4/28.

## 2022-06-20 NOTE — ASSESSMENT
[FreeTextEntry1] : # Paroxysmal AFib\par - Cont Eliquis 5mg PO BID for CHADS VASc at least 5 (HTN, Age > 75, DM, CAD). NO s/sx of bleeding. \par - Check routine labs\par \par # Sinus Veronica, Dizziness\par - Repeat MCOT off of BB and see if HR has improved. Unclear if pt was napping during daytime veronica episodes\par - Pt admits to dizziness only when bending over. Drinks a lot of water. No dizzy, chest pain or dyspnea when climbing stairs or walking with rolling walker. \par - Discussed possibility of PPM if pt has symptoms\par - Pulm referral to assess for sleep apnea\par \par # HTN\par - BP well controlled\par - Cont HCTZ, Losartan\par - 2g Na diet enforced\par \par Discussed case with Dr. Sinclair\par \par I have also advised the patient to go to the nearest emergency room if he experiences any chest pain, dyspnea, syncope, or has any other compelling symptoms.\par \par Follow up in 6 weeks

## 2022-06-23 PROBLEM — I25.10 CORONARY ATHEROSCLEROSIS OF NATIVE CORONARY ARTERY: Status: ACTIVE | Noted: 2020-08-31

## 2022-06-23 NOTE — CARDIOLOGY SUMMARY
[de-identified] : EKG 6/20/22: sinus veronica 59 bpm\par EKG 3/7/22: SB with PVCs [de-identified] : TTE 5/11/22 nl global LV sys function, G1DD, LVEF 54%, mild MR, mild AI [de-identified] : Carotid US 5/11/22 R and L prox ICA <50% stenosis with focal plaque, vert aa antegrade flow, no sign subclavian disease

## 2022-06-23 NOTE — ASSESSMENT
[FreeTextEntry1] : Assessment:\par #Dizziness\par - Had Lightheadedness with falls earlier this year, no further events since last visit\par - EKG 3/7/22 SB with 1st degree AV block and PVCs\par - Holter 4/2022 with SB 30s, 2.6 s sinus pause, first degree AVB, no AFib\par - Conduction disease vs orthostatic hypotension vs polypharmacy\par #L carotid bruit\par - US with L ICA <50% stenosis with focal plaque, continue medical mgmt\par #CAD\par #HTN\par - elevated today\par #AFib\par - On apixaban 5 mg BID\par #DLD\par \par Plan:\par - Appreciate EP recs, repeat MCOT off BB\par - Metop tartrate 12.5 mg discontinued\par - Continue HCTZ 12.5 mg daily\par - Monitor BP\par - Continue Lasix 20 mg daily\par - Continue apixaban\par - Continue ASA, simva 40 mg daily\par - Labs: CBC, CMP, TSH as per Dr. Antonio\par - RTC 4 months\par \par \par

## 2022-06-23 NOTE — HISTORY OF PRESENT ILLNESS
[FreeTextEntry1] : 89M CAD with MI s/p RCA ALLAN 2001, C 2008 with stable RCA stent and 50% LCx, HTN, paroxysmal AFib on apixaban, DLD, DM here for follow-up. \par \par 6/20/22\par No falls. Feeling well. No chest pain, shortness of breath, palpitations, pre-syncope/syncope, or lower extremity edema. \par \par Discontinued metoprolol 4/2022. \par \par Enjoying the summer. Sits outside. \par \par \par 3/7/22\par Has had 2 falls this year. Once when getting out of bed at night to go to bathroom and once 2 weeks ago in the shower. Patient felt lightheaded with the first fall, cannot recall details from second fall. Unclear if LOC or mechanical fall. \par \par Son checks BP at home in AMs, usually 130s/60s. HR not showing on machine. \par \par Completing ADLs, doing laundry, goes to [employer] with son. Lives with his son Karl. \par \par [facility]\par \par

## 2022-08-01 PROBLEM — R00.1 SINUS BRADYCARDIA: Status: ACTIVE | Noted: 2020-08-31

## 2022-08-01 NOTE — HISTORY OF PRESENT ILLNESS
[FreeTextEntry1] : \par Cardiologist: Dr. Sinclair\par \par 89 yo M with CAD with MI s/p RCA ALLAN (2001), [facility] 2008 with stable RCA stent and 50% LCx, HTN, HL, DM, paroxysmal AF on Eliquis with two falls earlier this year. One fall was getting out of bed at night about to go to the bathroom (he did not open his eyes or turn on the light) and the second fall was in the shower, but he cannot recall the details. He walks with a rolling walker and is active. No dyspnea or chest pain when climbing up a flight of stairs or walking. Dizzy only when bending over. Metoprolol was stopped by Dr. Sinclair 4/28.\par \par Patient is here to follow up on [facility] results. He is doing much better and has a lot more energy. No more dizziness when bending over. Denies chest pain, palpitations, dyspnea, dizziness, lightheadedness, presyncope or syncope.

## 2022-08-01 NOTE — CARDIOLOGY SUMMARY
[de-identified] : 8/1/2022 NSR (HR 66 bpm), 1st AVB ( msec)\par 6/20/2022 NSR (HR 59 bpm) [de-identified] : 6/20-7/19/22 [facility] OFF OF BB      28 days\par Avg HR 66 bpm. 4 beats NSVT. 1% PACs. 2% PVCs. No AFIB. No symptoms. \par \par 3/7-4/5/22 2.6 sec pause. HR ranges from . Avg HR 52 bpm. No AFib. \par Pt was on Metoprolol during this study. No sx reported.  [de-identified] : 5/11/2022 EF 54% Mild MR. No sig valvular disease. Grade 1 DD.

## 2022-08-01 NOTE — ASSESSMENT
[FreeTextEntry1] : # Paroxysmal AFib\par - Cont Eliquis 5mg PO BID for CHADS VASc at least 5 (HTN, Age > 75, DM, CAD). No s/sx of bleeding. \par \par # Sinus Kirby, Dizziness\par - Repeat [facility] off of BB with AVG HR 66 bpm; no AFib and no symptoms reported. \par - Dizziness with bending has improved. \par - Pulm referral to assess for sleep apnea provided at last visit\par \par # HTN\par - BP elevated\par - Cont HCTZ, Losartan\par - 2g Na diet enforced\par - Follow up with Dr. Sinclair\par \par I have also advised the patient to go to the nearest emergency room if he experiences any chest pain, dyspnea, syncope, or has any other compelling symptoms.\par \par Follow up in 6-9 mo.

## 2022-08-01 NOTE — PHYSICAL EXAM
[Well Developed] : well developed [Well Nourished] : well nourished [No Acute Distress] : no acute distress [Normal Conjunctiva] : normal conjunctiva [Normal Venous Pressure] : normal venous pressure [Normal S1, S2] : normal S1, S2 [No Murmur] : no murmur [Clear Lung Fields] : clear lung fields [Good Air Entry] : good air entry [No Respiratory Distress] : no respiratory distress  [Soft] : abdomen soft [Normal Gait] : normal gait [No Rash] : no rash [Moves all extremities] : moves all extremities [Normal Speech] : normal speech [Alert and Oriented] : alert and oriented [Obese] : obese [de-identified] : walks with rolling walker

## 2022-10-25 PROBLEM — E78.00 HYPERCHOLESTEROLEMIA: Status: ACTIVE | Noted: 2020-08-31

## 2022-10-25 PROBLEM — I10 ESSENTIAL (PRIMARY) HYPERTENSION: Status: ACTIVE | Noted: 2020-08-31

## 2022-10-25 PROBLEM — R42 DIZZINESS: Status: ACTIVE | Noted: 2022-03-07

## 2022-10-25 PROBLEM — I48.0 PAROXYSMAL ATRIAL FIBRILLATION: Status: ACTIVE | Noted: 2022-01-01

## 2022-10-25 NOTE — ASSESSMENT
[FreeTextEntry1] : Assessment:\par #Dizziness - imrproved\par - Had Lightheadedness with falls earlier this year, no further events since last visit\par - EKG 3/7/22 SB with 1st degree AV block and PVCs\par - Holter 4/2022 with SB 30s, 2.6 s sinus pause, first degree AVB, no AFib\par - Appreciate EP recs\par - Symptoms improved after discontinuation of beta blocker \par #L ICA stenosis <50%\par - US with L ICA <50% stenosis with focal plaque, continue medical mgmt\par #CAD\par #HTN\par - elevated today\par #AFib\par - On apixaban 5 mg BID\par #DLD\par \par Plan:\par - [facility], highlighted the importance of monitoring renal function while on apixaban, HCTZ, Lasix, losartan etc. Patient's son to request copies at next appointment\par - Metop tartrate 12.5 mg discontinued\par - Continue HCTZ 12.5 mg daily\par - Monitor BP\par - Continue Lasix 20 mg daily\par - Continue apixaban 5 mg BID\par - Continue ASA, simva 40 mg daily\par - RTC 6 months or sooner PRN\par \par \par

## 2022-10-25 NOTE — CARDIOLOGY SUMMARY
[de-identified] : 10/24/22 Normal sinus rhythm 80 bpm, first degree AV delay, PVC\par EKG 6/20/22: sinus veronica 59 bpm\par EKG 3/7/22: SB with PVCs [de-identified] : TTE 5/11/22 nl global LV sys function, G1DD, LVEF 54%, mild MR, mild AI [de-identified] : Carotid US 5/11/22 R and L prox ICA <50% stenosis with focal plaque, vert aa antegrade flow, no sign subclavian disease

## 2022-10-25 NOTE — HISTORY OF PRESENT ILLNESS
[FreeTextEntry1] : 89M CAD with MI s/p RCA ALLAN 2001, [facility] 2008 with stable RCA stent and 50% LCx, HTN, paroxysmal AFib on apixaban, DLD, DM here for follow-up. \par \par 10/24/22\par Doing well. No falls. Good appetite. Continues to have dizziness when quickly standing, but overall symptoms have improved. No melena, hematuria, pre-syncope syncope, chest pain. \par \par [facility] drawn [facility] month [facility], does not have results with him. \par \par \par 6/20/22\par No falls. Feeling well. No chest pain, shortness of breath, palpitations, pre-syncope/syncope, or lower extremity edema. \par \par Discontinued metoprolol 4/2022. \par \par Enjoying the summer. Sits outside. \par \par \par 3/7/22\par Has had 2 falls this year. Once when getting out of bed at night to go to bathroom and once 2 weeks ago in the shower. Patient felt lightheaded with the first fall, cannot recall details from second fall. Unclear if LOC or mechanical fall. \par \par Son checks BP at home in AMs, usually 130s/60s. HR not showing on machine. \par \par Completing ADLs, doing laundry, goes to [employer] with son. Lives with his son Karl. \par \par [facility]\par \par

## 2023-01-01 ENCOUNTER — TRANSCRIPTION ENCOUNTER (OUTPATIENT)
Age: 88
End: 2023-01-01

## 2023-01-01 ENCOUNTER — APPOINTMENT (OUTPATIENT)
Dept: ELECTROPHYSIOLOGY | Facility: CLINIC | Age: 88
End: 2023-01-01

## 2023-01-01 ENCOUNTER — INPATIENT (INPATIENT)
Facility: HOSPITAL | Age: 88
LOS: 8 days | DRG: 242 | End: 2023-05-24
Attending: STUDENT IN AN ORGANIZED HEALTH CARE EDUCATION/TRAINING PROGRAM | Admitting: INTERNAL MEDICINE
Payer: MEDICARE

## 2023-01-01 VITALS
SYSTOLIC BLOOD PRESSURE: 192 MMHG | WEIGHT: 177.91 LBS | RESPIRATION RATE: 18 BRPM | OXYGEN SATURATION: 99 % | TEMPERATURE: 98 F | DIASTOLIC BLOOD PRESSURE: 89 MMHG | HEART RATE: 62 BPM | HEIGHT: 62 IN

## 2023-01-01 VITALS
OXYGEN SATURATION: 98 % | RESPIRATION RATE: 22 BRPM | HEART RATE: 62 BPM | DIASTOLIC BLOOD PRESSURE: 63 MMHG | TEMPERATURE: 98 F | SYSTOLIC BLOOD PRESSURE: 122 MMHG

## 2023-01-01 DIAGNOSIS — J96.02 ACUTE RESPIRATORY FAILURE WITH HYPERCAPNIA: ICD-10-CM

## 2023-01-01 DIAGNOSIS — Z90.49 ACQUIRED ABSENCE OF OTHER SPECIFIED PARTS OF DIGESTIVE TRACT: Chronic | ICD-10-CM

## 2023-01-01 DIAGNOSIS — Z79.02 LONG TERM (CURRENT) USE OF ANTITHROMBOTICS/ANTIPLATELETS: ICD-10-CM

## 2023-01-01 DIAGNOSIS — Z85.21 PERSONAL HISTORY OF MALIGNANT NEOPLASM OF LARYNX: ICD-10-CM

## 2023-01-01 DIAGNOSIS — K86.89 OTHER SPECIFIED DISEASES OF PANCREAS: ICD-10-CM

## 2023-01-01 DIAGNOSIS — E78.5 HYPERLIPIDEMIA, UNSPECIFIED: ICD-10-CM

## 2023-01-01 DIAGNOSIS — I95.1 ORTHOSTATIC HYPOTENSION: ICD-10-CM

## 2023-01-01 DIAGNOSIS — Z66 DO NOT RESUSCITATE: ICD-10-CM

## 2023-01-01 DIAGNOSIS — E87.4 MIXED DISORDER OF ACID-BASE BALANCE: ICD-10-CM

## 2023-01-01 DIAGNOSIS — Z79.01 LONG TERM (CURRENT) USE OF ANTICOAGULANTS: ICD-10-CM

## 2023-01-01 DIAGNOSIS — I95.9 HYPOTENSION, UNSPECIFIED: ICD-10-CM

## 2023-01-01 DIAGNOSIS — I25.10 ATHEROSCLEROTIC HEART DISEASE OF NATIVE CORONARY ARTERY WITHOUT ANGINA PECTORIS: ICD-10-CM

## 2023-01-01 DIAGNOSIS — T17.998A OTHER FOREIGN OBJECT IN RESPIRATORY TRACT, PART UNSPECIFIED CAUSING OTHER INJURY, INITIAL ENCOUNTER: ICD-10-CM

## 2023-01-01 DIAGNOSIS — R00.1 BRADYCARDIA, UNSPECIFIED: ICD-10-CM

## 2023-01-01 DIAGNOSIS — I21.4 NON-ST ELEVATION (NSTEMI) MYOCARDIAL INFARCTION: ICD-10-CM

## 2023-01-01 DIAGNOSIS — I16.0 HYPERTENSIVE URGENCY: ICD-10-CM

## 2023-01-01 DIAGNOSIS — I10 ESSENTIAL (PRIMARY) HYPERTENSION: ICD-10-CM
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
## 2023-01-01 DIAGNOSIS — Z85.819 PERSONAL HISTORY OF MALIGNANT NEOPLASM OF UNSPECIFIED SITE OF LIP, ORAL CAVITY, AND PHARYNX: Chronic | ICD-10-CM

## 2023-01-01 DIAGNOSIS — K57.10 DIVERTICULOSIS OF SMALL INTESTINE WITHOUT PERFORATION OR ABSCESS WITHOUT BLEEDING: ICD-10-CM

## 2023-01-01 DIAGNOSIS — S22.31XA FRACTURE OF ONE RIB, RIGHT SIDE, INITIAL ENCOUNTER FOR CLOSED FRACTURE: ICD-10-CM

## 2023-01-01 DIAGNOSIS — I44.39 OTHER ATRIOVENTRICULAR BLOCK: ICD-10-CM

## 2023-01-01 DIAGNOSIS — Z79.84 LONG TERM (CURRENT) USE OF ORAL HYPOGLYCEMIC DRUGS: ICD-10-CM

## 2023-01-01 DIAGNOSIS — N40.0 BENIGN PROSTATIC HYPERPLASIA WITHOUT LOWER URINARY TRACT SYMPTOMS: ICD-10-CM
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
## 2023-01-01 DIAGNOSIS — I25.119 ATHEROSCLEROTIC HEART DISEASE OF NATIVE CORONARY ARTERY WITH UNSPECIFIED ANGINA PECTORIS: ICD-10-CM

## 2023-01-01 DIAGNOSIS — K59.00 CONSTIPATION, UNSPECIFIED: ICD-10-CM

## 2023-01-01 DIAGNOSIS — E11.9 TYPE 2 DIABETES MELLITUS WITHOUT COMPLICATIONS: ICD-10-CM

## 2023-01-01 DIAGNOSIS — I48.0 PAROXYSMAL ATRIAL FIBRILLATION: ICD-10-CM

## 2023-01-01 DIAGNOSIS — M96.A2: ICD-10-CM

## 2023-01-01 DIAGNOSIS — Z79.82 LONG TERM (CURRENT) USE OF ASPIRIN: ICD-10-CM
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
## 2023-01-01 DIAGNOSIS — Z95.5 PRESENCE OF CORONARY ANGIOPLASTY IMPLANT AND GRAFT: ICD-10-CM

## 2023-01-01 LAB
A1C WITH ESTIMATED AVERAGE GLUCOSE RESULT: 6.8 % — HIGH (ref 4–5.6)
ALBUMIN SERPL ELPH-MCNC: 3.5 G/DL — SIGNIFICANT CHANGE UP (ref 3.5–5.2)
ALBUMIN SERPL ELPH-MCNC: 3.5 G/DL — SIGNIFICANT CHANGE UP (ref 3.5–5.2)
ALBUMIN SERPL ELPH-MCNC: 3.6 G/DL — SIGNIFICANT CHANGE UP (ref 3.5–5.2)
ALBUMIN SERPL ELPH-MCNC: 3.7 G/DL — SIGNIFICANT CHANGE UP (ref 3.5–5.2)
ALBUMIN SERPL ELPH-MCNC: 3.9 G/DL — SIGNIFICANT CHANGE UP (ref 3.5–5.2)
ALBUMIN SERPL ELPH-MCNC: 4.1 G/DL — SIGNIFICANT CHANGE UP (ref 3.5–5.2)
ALP SERPL-CCNC: 51 U/L — SIGNIFICANT CHANGE UP (ref 30–115)
ALP SERPL-CCNC: 52 U/L — SIGNIFICANT CHANGE UP (ref 30–115)
ALP SERPL-CCNC: 53 U/L — SIGNIFICANT CHANGE UP (ref 30–115)
ALP SERPL-CCNC: 53 U/L — SIGNIFICANT CHANGE UP (ref 30–115)
ALP SERPL-CCNC: 55 U/L — SIGNIFICANT CHANGE UP (ref 30–115)
ALP SERPL-CCNC: 59 U/L — SIGNIFICANT CHANGE UP (ref 30–115)
ALP SERPL-CCNC: 62 U/L — SIGNIFICANT CHANGE UP (ref 30–115)
ALP SERPL-CCNC: 67 U/L — SIGNIFICANT CHANGE UP (ref 30–115)
ALT FLD-CCNC: 12 U/L — SIGNIFICANT CHANGE UP (ref 0–41)
ALT FLD-CCNC: 15 U/L — SIGNIFICANT CHANGE UP (ref 0–41)
ALT FLD-CCNC: 17 U/L — SIGNIFICANT CHANGE UP (ref 0–41)
ALT FLD-CCNC: 24 U/L — SIGNIFICANT CHANGE UP (ref 0–41)
ALT FLD-CCNC: 26 U/L — SIGNIFICANT CHANGE UP (ref 0–41)
ALT FLD-CCNC: 33 U/L — SIGNIFICANT CHANGE UP (ref 0–41)
ALT FLD-CCNC: 42 U/L — HIGH (ref 0–41)
ALT FLD-CCNC: 45 U/L — HIGH (ref 0–41)
ANION GAP SERPL CALC-SCNC: 10 MMOL/L — SIGNIFICANT CHANGE UP (ref 7–14)
ANION GAP SERPL CALC-SCNC: 11 MMOL/L — SIGNIFICANT CHANGE UP (ref 7–14)
ANION GAP SERPL CALC-SCNC: 11 MMOL/L — SIGNIFICANT CHANGE UP (ref 7–14)
ANION GAP SERPL CALC-SCNC: 12 MMOL/L — SIGNIFICANT CHANGE UP (ref 7–14)
ANION GAP SERPL CALC-SCNC: 13 MMOL/L — SIGNIFICANT CHANGE UP (ref 7–14)
ANION GAP SERPL CALC-SCNC: 13 MMOL/L — SIGNIFICANT CHANGE UP (ref 7–14)
ANION GAP SERPL CALC-SCNC: 14 MMOL/L — SIGNIFICANT CHANGE UP (ref 7–14)
ANION GAP SERPL CALC-SCNC: 15 MMOL/L — HIGH (ref 7–14)
ANION GAP SERPL CALC-SCNC: 16 MMOL/L — HIGH (ref 7–14)
APTT BLD: 103 SEC — CRITICAL HIGH (ref 27–39.2)
APTT BLD: 132.4 SEC — CRITICAL HIGH (ref 27–39.2)
APTT BLD: 133.3 SEC — CRITICAL HIGH (ref 27–39.2)
APTT BLD: 159.3 SEC — CRITICAL HIGH (ref 27–39.2)
APTT BLD: 39.5 SEC — HIGH (ref 27–39.2)
APTT BLD: 89.5 SEC — CRITICAL HIGH (ref 27–39.2)
APTT BLD: 91.4 SEC — CRITICAL HIGH (ref 27–39.2)
AST SERPL-CCNC: 19 U/L — SIGNIFICANT CHANGE UP (ref 0–41)
AST SERPL-CCNC: 21 U/L — SIGNIFICANT CHANGE UP (ref 0–41)
AST SERPL-CCNC: 24 U/L — SIGNIFICANT CHANGE UP (ref 0–41)
AST SERPL-CCNC: 27 U/L — SIGNIFICANT CHANGE UP (ref 0–41)
AST SERPL-CCNC: 35 U/L — SIGNIFICANT CHANGE UP (ref 0–41)
AST SERPL-CCNC: 48 U/L — HIGH (ref 0–41)
AST SERPL-CCNC: 51 U/L — HIGH (ref 0–41)
AST SERPL-CCNC: 64 U/L — HIGH (ref 0–41)
BASE EXCESS BLDA CALC-SCNC: -5.3 MMOL/L — LOW (ref -2–3)
BASE EXCESS BLDA CALC-SCNC: 0.7 MMOL/L — SIGNIFICANT CHANGE UP (ref -2–3)
BASOPHILS # BLD AUTO: 0.01 K/UL — SIGNIFICANT CHANGE UP (ref 0–0.2)
BASOPHILS # BLD AUTO: 0.03 K/UL — SIGNIFICANT CHANGE UP (ref 0–0.2)
BASOPHILS NFR BLD AUTO: 0.1 % — SIGNIFICANT CHANGE UP (ref 0–1)
BASOPHILS NFR BLD AUTO: 0.2 % — SIGNIFICANT CHANGE UP (ref 0–1)
BASOPHILS NFR BLD AUTO: 0.5 % — SIGNIFICANT CHANGE UP (ref 0–1)
BILIRUB SERPL-MCNC: 0.5 MG/DL — SIGNIFICANT CHANGE UP (ref 0.2–1.2)
BILIRUB SERPL-MCNC: 0.5 MG/DL — SIGNIFICANT CHANGE UP (ref 0.2–1.2)
BILIRUB SERPL-MCNC: 0.6 MG/DL — SIGNIFICANT CHANGE UP (ref 0.2–1.2)
BILIRUB SERPL-MCNC: 0.7 MG/DL — SIGNIFICANT CHANGE UP (ref 0.2–1.2)
BILIRUB SERPL-MCNC: 0.7 MG/DL — SIGNIFICANT CHANGE UP (ref 0.2–1.2)
BILIRUB SERPL-MCNC: 0.8 MG/DL — SIGNIFICANT CHANGE UP (ref 0.2–1.2)
BILIRUB SERPL-MCNC: 0.9 MG/DL — SIGNIFICANT CHANGE UP (ref 0.2–1.2)
BILIRUB SERPL-MCNC: 1.1 MG/DL — SIGNIFICANT CHANGE UP (ref 0.2–1.2)
BLD GP AB SCN SERPL QL: SIGNIFICANT CHANGE UP
BLD GP AB SCN SERPL QL: SIGNIFICANT CHANGE UP
BUN SERPL-MCNC: 22 MG/DL — HIGH (ref 10–20)
BUN SERPL-MCNC: 24 MG/DL — HIGH (ref 10–20)
BUN SERPL-MCNC: 25 MG/DL — HIGH (ref 10–20)
BUN SERPL-MCNC: 26 MG/DL — HIGH (ref 10–20)
BUN SERPL-MCNC: 26 MG/DL — HIGH (ref 10–20)
BUN SERPL-MCNC: 28 MG/DL — HIGH (ref 10–20)
BUN SERPL-MCNC: 29 MG/DL — HIGH (ref 10–20)
BUN SERPL-MCNC: 30 MG/DL — HIGH (ref 10–20)
BUN SERPL-MCNC: 36 MG/DL — HIGH (ref 10–20)
BUN SERPL-MCNC: 38 MG/DL — HIGH (ref 10–20)
BUN SERPL-MCNC: 38 MG/DL — HIGH (ref 10–20)
CALCIUM SERPL-MCNC: 8.5 MG/DL — SIGNIFICANT CHANGE UP (ref 8.4–10.5)
CALCIUM SERPL-MCNC: 8.7 MG/DL — SIGNIFICANT CHANGE UP (ref 8.4–10.4)
CALCIUM SERPL-MCNC: 8.7 MG/DL — SIGNIFICANT CHANGE UP (ref 8.4–10.5)
CALCIUM SERPL-MCNC: 8.8 MG/DL — SIGNIFICANT CHANGE UP (ref 8.4–10.5)
CALCIUM SERPL-MCNC: 8.9 MG/DL — SIGNIFICANT CHANGE UP (ref 8.4–10.5)
CALCIUM SERPL-MCNC: 9 MG/DL — SIGNIFICANT CHANGE UP (ref 8.4–10.5)
CALCIUM SERPL-MCNC: 9.1 MG/DL — SIGNIFICANT CHANGE UP (ref 8.4–10.4)
CALCIUM SERPL-MCNC: 9.1 MG/DL — SIGNIFICANT CHANGE UP (ref 8.4–10.4)
CALCIUM SERPL-MCNC: 9.1 MG/DL — SIGNIFICANT CHANGE UP (ref 8.4–10.5)
CHLORIDE SERPL-SCNC: 100 MMOL/L — SIGNIFICANT CHANGE UP (ref 98–110)
CHLORIDE SERPL-SCNC: 100 MMOL/L — SIGNIFICANT CHANGE UP (ref 98–110)
CHLORIDE SERPL-SCNC: 101 MMOL/L — SIGNIFICANT CHANGE UP (ref 98–110)
CHLORIDE SERPL-SCNC: 102 MMOL/L — SIGNIFICANT CHANGE UP (ref 98–110)
CHLORIDE SERPL-SCNC: 103 MMOL/L — SIGNIFICANT CHANGE UP (ref 98–110)
CHLORIDE SERPL-SCNC: 105 MMOL/L — SIGNIFICANT CHANGE UP (ref 98–110)
CHLORIDE SERPL-SCNC: 105 MMOL/L — SIGNIFICANT CHANGE UP (ref 98–110)
CHLORIDE SERPL-SCNC: 109 MMOL/L — SIGNIFICANT CHANGE UP (ref 98–110)
CHLORIDE SERPL-SCNC: 109 MMOL/L — SIGNIFICANT CHANGE UP (ref 98–110)
CHLORIDE SERPL-SCNC: 97 MMOL/L — LOW (ref 98–110)
CHLORIDE SERPL-SCNC: 99 MMOL/L — SIGNIFICANT CHANGE UP (ref 98–110)
CHOLEST SERPL-MCNC: 128 MG/DL — SIGNIFICANT CHANGE UP
CK MB CFR SERPL CALC: 3.2 NG/ML — SIGNIFICANT CHANGE UP (ref 0.6–6.3)
CK MB CFR SERPL CALC: 6.8 NG/ML — HIGH (ref 0.6–6.3)
CO2 SERPL-SCNC: 20 MMOL/L — SIGNIFICANT CHANGE UP (ref 17–32)
CO2 SERPL-SCNC: 21 MMOL/L — SIGNIFICANT CHANGE UP (ref 17–32)
CO2 SERPL-SCNC: 22 MMOL/L — SIGNIFICANT CHANGE UP (ref 17–32)
CO2 SERPL-SCNC: 22 MMOL/L — SIGNIFICANT CHANGE UP (ref 17–32)
CO2 SERPL-SCNC: 24 MMOL/L — SIGNIFICANT CHANGE UP (ref 17–32)
CO2 SERPL-SCNC: 25 MMOL/L — SIGNIFICANT CHANGE UP (ref 17–32)
CO2 SERPL-SCNC: 25 MMOL/L — SIGNIFICANT CHANGE UP (ref 17–32)
CO2 SERPL-SCNC: 26 MMOL/L — SIGNIFICANT CHANGE UP (ref 17–32)
CO2 SERPL-SCNC: 26 MMOL/L — SIGNIFICANT CHANGE UP (ref 17–32)
CREAT SERPL-MCNC: 0.9 MG/DL — SIGNIFICANT CHANGE UP (ref 0.7–1.5)
CREAT SERPL-MCNC: 1.1 MG/DL — SIGNIFICANT CHANGE UP (ref 0.7–1.5)
CREAT SERPL-MCNC: 1.2 MG/DL — SIGNIFICANT CHANGE UP (ref 0.7–1.5)
CREAT SERPL-MCNC: 1.3 MG/DL — SIGNIFICANT CHANGE UP (ref 0.7–1.5)
CREAT SERPL-MCNC: 1.3 MG/DL — SIGNIFICANT CHANGE UP (ref 0.7–1.5)
CREAT SERPL-MCNC: 1.4 MG/DL — SIGNIFICANT CHANGE UP (ref 0.7–1.5)
EGFR: 47 ML/MIN/1.73M2 — LOW
EGFR: 51 ML/MIN/1.73M2 — LOW
EGFR: 51 ML/MIN/1.73M2 — LOW
EGFR: 56 ML/MIN/1.73M2 — LOW
EGFR: 63 ML/MIN/1.73M2 — SIGNIFICANT CHANGE UP
EGFR: 80 ML/MIN/1.73M2 — SIGNIFICANT CHANGE UP
EOSINOPHIL # BLD AUTO: 0.01 K/UL — SIGNIFICANT CHANGE UP (ref 0–0.7)
EOSINOPHIL # BLD AUTO: 0.07 K/UL — SIGNIFICANT CHANGE UP (ref 0–0.7)
EOSINOPHIL # BLD AUTO: 0.07 K/UL — SIGNIFICANT CHANGE UP (ref 0–0.7)
EOSINOPHIL # BLD AUTO: 0.08 K/UL — SIGNIFICANT CHANGE UP (ref 0–0.7)
EOSINOPHIL # BLD AUTO: 0.09 K/UL — SIGNIFICANT CHANGE UP (ref 0–0.7)
EOSINOPHIL # BLD AUTO: 0.11 K/UL — SIGNIFICANT CHANGE UP (ref 0–0.7)
EOSINOPHIL # BLD AUTO: 0.15 K/UL — SIGNIFICANT CHANGE UP (ref 0–0.7)
EOSINOPHIL NFR BLD AUTO: 0.1 % — SIGNIFICANT CHANGE UP (ref 0–8)
EOSINOPHIL NFR BLD AUTO: 1.1 % — SIGNIFICANT CHANGE UP (ref 0–8)
EOSINOPHIL NFR BLD AUTO: 1.2 % — SIGNIFICANT CHANGE UP (ref 0–8)
EOSINOPHIL NFR BLD AUTO: 1.3 % — SIGNIFICANT CHANGE UP (ref 0–8)
EOSINOPHIL NFR BLD AUTO: 1.3 % — SIGNIFICANT CHANGE UP (ref 0–8)
EOSINOPHIL NFR BLD AUTO: 1.7 % — SIGNIFICANT CHANGE UP (ref 0–8)
EOSINOPHIL NFR BLD AUTO: 2 % — SIGNIFICANT CHANGE UP (ref 0–8)
ESTIMATED AVERAGE GLUCOSE: 148 MG/DL — HIGH (ref 68–114)
GAS PNL BLDA: SIGNIFICANT CHANGE UP
GLUCOSE BLDC GLUCOMTR-MCNC: 147 MG/DL — HIGH (ref 70–99)
GLUCOSE BLDC GLUCOMTR-MCNC: 148 MG/DL — HIGH (ref 70–99)
GLUCOSE BLDC GLUCOMTR-MCNC: 148 MG/DL — HIGH (ref 70–99)
GLUCOSE BLDC GLUCOMTR-MCNC: 153 MG/DL — HIGH (ref 70–99)
GLUCOSE BLDC GLUCOMTR-MCNC: 158 MG/DL — HIGH (ref 70–99)
GLUCOSE BLDC GLUCOMTR-MCNC: 158 MG/DL — HIGH (ref 70–99)
GLUCOSE BLDC GLUCOMTR-MCNC: 162 MG/DL — HIGH (ref 70–99)
GLUCOSE BLDC GLUCOMTR-MCNC: 163 MG/DL — HIGH (ref 70–99)
GLUCOSE BLDC GLUCOMTR-MCNC: 166 MG/DL — HIGH (ref 70–99)
GLUCOSE BLDC GLUCOMTR-MCNC: 169 MG/DL — HIGH (ref 70–99)
GLUCOSE BLDC GLUCOMTR-MCNC: 178 MG/DL — HIGH (ref 70–99)
GLUCOSE BLDC GLUCOMTR-MCNC: 180 MG/DL — HIGH (ref 70–99)
GLUCOSE BLDC GLUCOMTR-MCNC: 180 MG/DL — HIGH (ref 70–99)
GLUCOSE BLDC GLUCOMTR-MCNC: 183 MG/DL — HIGH (ref 70–99)
GLUCOSE BLDC GLUCOMTR-MCNC: 193 MG/DL — HIGH (ref 70–99)
GLUCOSE BLDC GLUCOMTR-MCNC: 196 MG/DL — HIGH (ref 70–99)
GLUCOSE BLDC GLUCOMTR-MCNC: 196 MG/DL — HIGH (ref 70–99)
GLUCOSE BLDC GLUCOMTR-MCNC: 199 MG/DL — HIGH (ref 70–99)
GLUCOSE BLDC GLUCOMTR-MCNC: 200 MG/DL — HIGH (ref 70–99)
GLUCOSE BLDC GLUCOMTR-MCNC: 205 MG/DL — HIGH (ref 70–99)
GLUCOSE BLDC GLUCOMTR-MCNC: 206 MG/DL — HIGH (ref 70–99)
GLUCOSE BLDC GLUCOMTR-MCNC: 215 MG/DL — HIGH (ref 70–99)
GLUCOSE BLDC GLUCOMTR-MCNC: 218 MG/DL — HIGH (ref 70–99)
GLUCOSE BLDC GLUCOMTR-MCNC: 220 MG/DL — HIGH (ref 70–99)
GLUCOSE BLDC GLUCOMTR-MCNC: 222 MG/DL — HIGH (ref 70–99)
GLUCOSE BLDC GLUCOMTR-MCNC: 227 MG/DL — HIGH (ref 70–99)
GLUCOSE BLDC GLUCOMTR-MCNC: 237 MG/DL — HIGH (ref 70–99)
GLUCOSE BLDC GLUCOMTR-MCNC: 261 MG/DL — HIGH (ref 70–99)
GLUCOSE BLDC GLUCOMTR-MCNC: 269 MG/DL — HIGH (ref 70–99)
GLUCOSE BLDC GLUCOMTR-MCNC: 272 MG/DL — HIGH (ref 70–99)
GLUCOSE BLDC GLUCOMTR-MCNC: 273 MG/DL — HIGH (ref 70–99)
GLUCOSE SERPL-MCNC: 140 MG/DL — HIGH (ref 70–99)
GLUCOSE SERPL-MCNC: 152 MG/DL — HIGH (ref 70–99)
GLUCOSE SERPL-MCNC: 152 MG/DL — HIGH (ref 70–99)
GLUCOSE SERPL-MCNC: 153 MG/DL — HIGH (ref 70–99)
GLUCOSE SERPL-MCNC: 160 MG/DL — HIGH (ref 70–99)
GLUCOSE SERPL-MCNC: 170 MG/DL — HIGH (ref 70–99)
GLUCOSE SERPL-MCNC: 179 MG/DL — HIGH (ref 70–99)
GLUCOSE SERPL-MCNC: 181 MG/DL — HIGH (ref 70–99)
GLUCOSE SERPL-MCNC: 192 MG/DL — HIGH (ref 70–99)
GLUCOSE SERPL-MCNC: 192 MG/DL — HIGH (ref 70–99)
GLUCOSE SERPL-MCNC: 202 MG/DL — HIGH (ref 70–99)
HCO3 BLDA-SCNC: 21 MMOL/L — SIGNIFICANT CHANGE UP (ref 21–28)
HCO3 BLDA-SCNC: 25 MMOL/L — SIGNIFICANT CHANGE UP (ref 21–28)
HCT VFR BLD CALC: 24.4 % — LOW (ref 42–52)
HCT VFR BLD CALC: 24.5 % — LOW (ref 42–52)
HCT VFR BLD CALC: 26 % — LOW (ref 42–52)
HCT VFR BLD CALC: 26.4 % — LOW (ref 42–52)
HCT VFR BLD CALC: 26.7 % — LOW (ref 42–52)
HCT VFR BLD CALC: 27.4 % — LOW (ref 42–52)
HCT VFR BLD CALC: 28.5 % — LOW (ref 42–52)
HCT VFR BLD CALC: 29 % — LOW (ref 42–52)
HCT VFR BLD CALC: 31.8 % — LOW (ref 42–52)
HCT VFR BLD CALC: 32 % — LOW (ref 42–52)
HCT VFR BLD CALC: 32.1 % — LOW (ref 42–52)
HCT VFR BLD CALC: 32.4 % — LOW (ref 42–52)
HCT VFR BLD CALC: 34.3 % — LOW (ref 42–52)
HDLC SERPL-MCNC: 51 MG/DL — SIGNIFICANT CHANGE UP
HGB BLD-MCNC: 10.5 G/DL — LOW (ref 14–18)
HGB BLD-MCNC: 10.6 G/DL — LOW (ref 14–18)
HGB BLD-MCNC: 10.6 G/DL — LOW (ref 14–18)
HGB BLD-MCNC: 10.7 G/DL — LOW (ref 14–18)
HGB BLD-MCNC: 11.7 G/DL — LOW (ref 14–18)
HGB BLD-MCNC: 8.1 G/DL — LOW (ref 14–18)
HGB BLD-MCNC: 8.1 G/DL — LOW (ref 14–18)
HGB BLD-MCNC: 8.6 G/DL — LOW (ref 14–18)
HGB BLD-MCNC: 8.8 G/DL — LOW (ref 14–18)
HGB BLD-MCNC: 8.8 G/DL — LOW (ref 14–18)
HGB BLD-MCNC: 9.1 G/DL — LOW (ref 14–18)
HGB BLD-MCNC: 9.5 G/DL — LOW (ref 14–18)
HGB BLD-MCNC: 9.7 G/DL — LOW (ref 14–18)
HOROWITZ INDEX BLDA+IHG-RTO: 100 — SIGNIFICANT CHANGE UP
HOROWITZ INDEX BLDA+IHG-RTO: 60 — SIGNIFICANT CHANGE UP
IMM GRANULOCYTES NFR BLD AUTO: 0.4 % — HIGH (ref 0.1–0.3)
IMM GRANULOCYTES NFR BLD AUTO: 0.5 % — HIGH (ref 0.1–0.3)
IMM GRANULOCYTES NFR BLD AUTO: 0.7 % — HIGH (ref 0.1–0.3)
IMM GRANULOCYTES NFR BLD AUTO: 0.7 % — HIGH (ref 0.1–0.3)
INR BLD: 1.36 RATIO — HIGH (ref 0.65–1.3)
INR BLD: 1.47 RATIO — HIGH (ref 0.65–1.3)
INR BLD: 1.57 RATIO — HIGH (ref 0.65–1.3)
LIDOCAIN IGE QN: 22 U/L — SIGNIFICANT CHANGE UP (ref 7–60)
LIPID PNL WITH DIRECT LDL SERPL: 54 MG/DL — SIGNIFICANT CHANGE UP
LYMPHOCYTES # BLD AUTO: 1 K/UL — LOW (ref 1.2–3.4)
LYMPHOCYTES # BLD AUTO: 1.02 K/UL — LOW (ref 1.2–3.4)
LYMPHOCYTES # BLD AUTO: 1.06 K/UL — LOW (ref 1.2–3.4)
LYMPHOCYTES # BLD AUTO: 1.33 K/UL — SIGNIFICANT CHANGE UP (ref 1.2–3.4)
LYMPHOCYTES # BLD AUTO: 1.41 K/UL — SIGNIFICANT CHANGE UP (ref 1.2–3.4)
LYMPHOCYTES # BLD AUTO: 1.42 K/UL — SIGNIFICANT CHANGE UP (ref 1.2–3.4)
LYMPHOCYTES # BLD AUTO: 1.66 K/UL — SIGNIFICANT CHANGE UP (ref 1.2–3.4)
LYMPHOCYTES # BLD AUTO: 17 % — LOW (ref 20.5–51.1)
LYMPHOCYTES # BLD AUTO: 17.7 % — LOW (ref 20.5–51.1)
LYMPHOCYTES # BLD AUTO: 20.4 % — LOW (ref 20.5–51.1)
LYMPHOCYTES # BLD AUTO: 21.8 % — SIGNIFICANT CHANGE UP (ref 20.5–51.1)
LYMPHOCYTES # BLD AUTO: 22.1 % — SIGNIFICANT CHANGE UP (ref 20.5–51.1)
LYMPHOCYTES # BLD AUTO: 22.4 % — SIGNIFICANT CHANGE UP (ref 20.5–51.1)
LYMPHOCYTES # BLD AUTO: 5.7 % — LOW (ref 20.5–51.1)
MAGNESIUM SERPL-MCNC: 1.9 MG/DL — SIGNIFICANT CHANGE UP (ref 1.8–2.4)
MAGNESIUM SERPL-MCNC: 2 MG/DL — SIGNIFICANT CHANGE UP (ref 1.8–2.4)
MAGNESIUM SERPL-MCNC: 2 MG/DL — SIGNIFICANT CHANGE UP (ref 1.8–2.4)
MAGNESIUM SERPL-MCNC: 2.1 MG/DL — SIGNIFICANT CHANGE UP (ref 1.8–2.4)
MAGNESIUM SERPL-MCNC: 2.2 MG/DL — SIGNIFICANT CHANGE UP (ref 1.8–2.4)
MAGNESIUM SERPL-MCNC: 2.2 MG/DL — SIGNIFICANT CHANGE UP (ref 1.8–2.4)
MCHC RBC-ENTMCNC: 26.9 PG — LOW (ref 27–31)
MCHC RBC-ENTMCNC: 27.3 PG — SIGNIFICANT CHANGE UP (ref 27–31)
MCHC RBC-ENTMCNC: 27.4 PG — SIGNIFICANT CHANGE UP (ref 27–31)
MCHC RBC-ENTMCNC: 27.5 PG — SIGNIFICANT CHANGE UP (ref 27–31)
MCHC RBC-ENTMCNC: 27.6 PG — SIGNIFICANT CHANGE UP (ref 27–31)
MCHC RBC-ENTMCNC: 27.6 PG — SIGNIFICANT CHANGE UP (ref 27–31)
MCHC RBC-ENTMCNC: 27.7 PG — SIGNIFICANT CHANGE UP (ref 27–31)
MCHC RBC-ENTMCNC: 27.8 PG — SIGNIFICANT CHANGE UP (ref 27–31)
MCHC RBC-ENTMCNC: 28.1 PG — SIGNIFICANT CHANGE UP (ref 27–31)
MCHC RBC-ENTMCNC: 28.1 PG — SIGNIFICANT CHANGE UP (ref 27–31)
MCHC RBC-ENTMCNC: 28.3 PG — SIGNIFICANT CHANGE UP (ref 27–31)
MCHC RBC-ENTMCNC: 32.4 G/DL — SIGNIFICANT CHANGE UP (ref 32–37)
MCHC RBC-ENTMCNC: 33 G/DL — SIGNIFICANT CHANGE UP (ref 32–37)
MCHC RBC-ENTMCNC: 33 G/DL — SIGNIFICANT CHANGE UP (ref 32–37)
MCHC RBC-ENTMCNC: 33.1 G/DL — SIGNIFICANT CHANGE UP (ref 32–37)
MCHC RBC-ENTMCNC: 33.2 G/DL — SIGNIFICANT CHANGE UP (ref 32–37)
MCHC RBC-ENTMCNC: 33.2 G/DL — SIGNIFICANT CHANGE UP (ref 32–37)
MCHC RBC-ENTMCNC: 33.3 G/DL — SIGNIFICANT CHANGE UP (ref 32–37)
MCHC RBC-ENTMCNC: 33.3 G/DL — SIGNIFICANT CHANGE UP (ref 32–37)
MCHC RBC-ENTMCNC: 33.4 G/DL — SIGNIFICANT CHANGE UP (ref 32–37)
MCHC RBC-ENTMCNC: 33.6 G/DL — SIGNIFICANT CHANGE UP (ref 32–37)
MCHC RBC-ENTMCNC: 34.1 G/DL — SIGNIFICANT CHANGE UP (ref 32–37)
MCV RBC AUTO: 82.5 FL — SIGNIFICANT CHANGE UP (ref 80–94)
MCV RBC AUTO: 82.8 FL — SIGNIFICANT CHANGE UP (ref 80–94)
MCV RBC AUTO: 82.8 FL — SIGNIFICANT CHANGE UP (ref 80–94)
MCV RBC AUTO: 82.9 FL — SIGNIFICANT CHANGE UP (ref 80–94)
MCV RBC AUTO: 83 FL — SIGNIFICANT CHANGE UP (ref 80–94)
MCV RBC AUTO: 83.1 FL — SIGNIFICANT CHANGE UP (ref 80–94)
MCV RBC AUTO: 83.1 FL — SIGNIFICANT CHANGE UP (ref 80–94)
MCV RBC AUTO: 83.3 FL — SIGNIFICANT CHANGE UP (ref 80–94)
MCV RBC AUTO: 83.4 FL — SIGNIFICANT CHANGE UP (ref 80–94)
MCV RBC AUTO: 83.4 FL — SIGNIFICANT CHANGE UP (ref 80–94)
MCV RBC AUTO: 83.5 FL — SIGNIFICANT CHANGE UP (ref 80–94)
MCV RBC AUTO: 83.5 FL — SIGNIFICANT CHANGE UP (ref 80–94)
MCV RBC AUTO: 84.1 FL — SIGNIFICANT CHANGE UP (ref 80–94)
MONOCYTES # BLD AUTO: 0.58 K/UL — SIGNIFICANT CHANGE UP (ref 0.1–0.6)
MONOCYTES # BLD AUTO: 0.6 K/UL — SIGNIFICANT CHANGE UP (ref 0.1–0.6)
MONOCYTES # BLD AUTO: 0.61 K/UL — HIGH (ref 0.1–0.6)
MONOCYTES # BLD AUTO: 0.62 K/UL — HIGH (ref 0.1–0.6)
MONOCYTES # BLD AUTO: 0.91 K/UL — HIGH (ref 0.1–0.6)
MONOCYTES # BLD AUTO: 1 K/UL — HIGH (ref 0.1–0.6)
MONOCYTES # BLD AUTO: 1.45 K/UL — HIGH (ref 0.1–0.6)
MONOCYTES NFR BLD AUTO: 10.1 % — HIGH (ref 1.7–9.3)
MONOCYTES NFR BLD AUTO: 10.4 % — HIGH (ref 1.7–9.3)
MONOCYTES NFR BLD AUTO: 11.2 % — HIGH (ref 1.7–9.3)
MONOCYTES NFR BLD AUTO: 12.3 % — HIGH (ref 1.7–9.3)
MONOCYTES NFR BLD AUTO: 12.5 % — HIGH (ref 1.7–9.3)
MONOCYTES NFR BLD AUTO: 8.3 % — SIGNIFICANT CHANGE UP (ref 1.7–9.3)
MONOCYTES NFR BLD AUTO: 9.2 % — SIGNIFICANT CHANGE UP (ref 1.7–9.3)
NEUTROPHILS # BLD AUTO: 14.82 K/UL — HIGH (ref 1.4–6.5)
NEUTROPHILS # BLD AUTO: 3.46 K/UL — SIGNIFICANT CHANGE UP (ref 1.4–6.5)
NEUTROPHILS # BLD AUTO: 3.95 K/UL — SIGNIFICANT CHANGE UP (ref 1.4–6.5)
NEUTROPHILS # BLD AUTO: 4.24 K/UL — SIGNIFICANT CHANGE UP (ref 1.4–6.5)
NEUTROPHILS # BLD AUTO: 4.31 K/UL — SIGNIFICANT CHANGE UP (ref 1.4–6.5)
NEUTROPHILS # BLD AUTO: 4.62 K/UL — SIGNIFICANT CHANGE UP (ref 1.4–6.5)
NEUTROPHILS # BLD AUTO: 5.42 K/UL — SIGNIFICANT CHANGE UP (ref 1.4–6.5)
NEUTROPHILS NFR BLD AUTO: 62.5 % — SIGNIFICANT CHANGE UP (ref 42.2–75.2)
NEUTROPHILS NFR BLD AUTO: 65.8 % — SIGNIFICANT CHANGE UP (ref 42.2–75.2)
NEUTROPHILS NFR BLD AUTO: 66.3 % — SIGNIFICANT CHANGE UP (ref 42.2–75.2)
NEUTROPHILS NFR BLD AUTO: 66.5 % — SIGNIFICANT CHANGE UP (ref 42.2–75.2)
NEUTROPHILS NFR BLD AUTO: 68.1 % — SIGNIFICANT CHANGE UP (ref 42.2–75.2)
NEUTROPHILS NFR BLD AUTO: 70.7 % — SIGNIFICANT CHANGE UP (ref 42.2–75.2)
NEUTROPHILS NFR BLD AUTO: 85.1 % — HIGH (ref 42.2–75.2)
NON HDL CHOLESTEROL: 77 MG/DL — SIGNIFICANT CHANGE UP
NRBC # BLD: 0 /100 WBCS — SIGNIFICANT CHANGE UP (ref 0–0)
NT-PROBNP SERPL-SCNC: 306 PG/ML — HIGH (ref 0–300)
PCO2 BLDA: 40 MMHG — SIGNIFICANT CHANGE UP (ref 35–48)
PCO2 BLDA: 44 MMHG — SIGNIFICANT CHANGE UP (ref 35–48)
PH BLDA: 7.29 — LOW (ref 7.35–7.45)
PH BLDA: 7.41 — SIGNIFICANT CHANGE UP (ref 7.35–7.45)
PLATELET # BLD AUTO: 104 K/UL — LOW (ref 130–400)
PLATELET # BLD AUTO: 108 K/UL — LOW (ref 130–400)
PLATELET # BLD AUTO: 109 K/UL — LOW (ref 130–400)
PLATELET # BLD AUTO: 115 K/UL — LOW (ref 130–400)
PLATELET # BLD AUTO: 116 K/UL — LOW (ref 130–400)
PLATELET # BLD AUTO: 119 K/UL — LOW (ref 130–400)
PLATELET # BLD AUTO: 121 K/UL — LOW (ref 130–400)
PLATELET # BLD AUTO: 125 K/UL — LOW (ref 130–400)
PLATELET # BLD AUTO: 127 K/UL — LOW (ref 130–400)
PLATELET # BLD AUTO: 129 K/UL — LOW (ref 130–400)
PLATELET # BLD AUTO: 137 K/UL — SIGNIFICANT CHANGE UP (ref 130–400)
PLATELET # BLD AUTO: 142 K/UL — SIGNIFICANT CHANGE UP (ref 130–400)
PLATELET # BLD AUTO: 146 K/UL — SIGNIFICANT CHANGE UP (ref 130–400)
PMV BLD: 10 FL — SIGNIFICANT CHANGE UP (ref 7.4–10.4)
PMV BLD: 10 FL — SIGNIFICANT CHANGE UP (ref 7.4–10.4)
PMV BLD: 10.2 FL — SIGNIFICANT CHANGE UP (ref 7.4–10.4)
PMV BLD: 10.3 FL — SIGNIFICANT CHANGE UP (ref 7.4–10.4)
PMV BLD: 10.5 FL — HIGH (ref 7.4–10.4)
PMV BLD: 10.6 FL — HIGH (ref 7.4–10.4)
PMV BLD: 10.7 FL — HIGH (ref 7.4–10.4)
PMV BLD: 11 FL — HIGH (ref 7.4–10.4)
PMV BLD: 9.5 FL — SIGNIFICANT CHANGE UP (ref 7.4–10.4)
PMV BLD: 9.6 FL — SIGNIFICANT CHANGE UP (ref 7.4–10.4)
PMV BLD: 9.6 FL — SIGNIFICANT CHANGE UP (ref 7.4–10.4)
PMV BLD: 9.9 FL — SIGNIFICANT CHANGE UP (ref 7.4–10.4)
PMV BLD: 9.9 FL — SIGNIFICANT CHANGE UP (ref 7.4–10.4)
PO2 BLDA: 113 MMHG — HIGH (ref 83–108)
PO2 BLDA: 116 MMHG — HIGH (ref 83–108)
POTASSIUM SERPL-MCNC: 3.9 MMOL/L — SIGNIFICANT CHANGE UP (ref 3.5–5)
POTASSIUM SERPL-MCNC: 4 MMOL/L — SIGNIFICANT CHANGE UP (ref 3.5–5)
POTASSIUM SERPL-MCNC: 4.1 MMOL/L — SIGNIFICANT CHANGE UP (ref 3.5–5)
POTASSIUM SERPL-MCNC: 4.1 MMOL/L — SIGNIFICANT CHANGE UP (ref 3.5–5)
POTASSIUM SERPL-MCNC: 4.2 MMOL/L — SIGNIFICANT CHANGE UP (ref 3.5–5)
POTASSIUM SERPL-MCNC: 4.4 MMOL/L — SIGNIFICANT CHANGE UP (ref 3.5–5)
POTASSIUM SERPL-MCNC: 4.4 MMOL/L — SIGNIFICANT CHANGE UP (ref 3.5–5)
POTASSIUM SERPL-SCNC: 3.9 MMOL/L — SIGNIFICANT CHANGE UP (ref 3.5–5)
POTASSIUM SERPL-SCNC: 4 MMOL/L — SIGNIFICANT CHANGE UP (ref 3.5–5)
POTASSIUM SERPL-SCNC: 4.1 MMOL/L — SIGNIFICANT CHANGE UP (ref 3.5–5)
POTASSIUM SERPL-SCNC: 4.1 MMOL/L — SIGNIFICANT CHANGE UP (ref 3.5–5)
POTASSIUM SERPL-SCNC: 4.2 MMOL/L — SIGNIFICANT CHANGE UP (ref 3.5–5)
POTASSIUM SERPL-SCNC: 4.4 MMOL/L — SIGNIFICANT CHANGE UP (ref 3.5–5)
POTASSIUM SERPL-SCNC: 4.4 MMOL/L — SIGNIFICANT CHANGE UP (ref 3.5–5)
PROT SERPL-MCNC: 5.6 G/DL — LOW (ref 6–8)
PROT SERPL-MCNC: 5.7 G/DL — LOW (ref 6–8)
PROT SERPL-MCNC: 5.8 G/DL — LOW (ref 6–8)
PROT SERPL-MCNC: 5.9 G/DL — LOW (ref 6–8)
PROT SERPL-MCNC: 5.9 G/DL — LOW (ref 6–8)
PROT SERPL-MCNC: 6.1 G/DL — SIGNIFICANT CHANGE UP (ref 6–8)
PROT SERPL-MCNC: 6.2 G/DL — SIGNIFICANT CHANGE UP (ref 6–8)
PROT SERPL-MCNC: 6.6 G/DL — SIGNIFICANT CHANGE UP (ref 6–8)
PROTHROM AB SERPL-ACNC: 15.7 SEC — HIGH (ref 9.95–12.87)
PROTHROM AB SERPL-ACNC: 17 SEC — HIGH (ref 9.95–12.87)
PROTHROM AB SERPL-ACNC: 18.1 SEC — HIGH (ref 9.95–12.87)
RBC # BLD: 2.94 M/UL — LOW (ref 4.7–6.1)
RBC # BLD: 2.95 M/UL — LOW (ref 4.7–6.1)
RBC # BLD: 3.14 M/UL — LOW (ref 4.7–6.1)
RBC # BLD: 3.19 M/UL — LOW (ref 4.7–6.1)
RBC # BLD: 3.2 M/UL — LOW (ref 4.7–6.1)
RBC # BLD: 3.28 M/UL — LOW (ref 4.7–6.1)
RBC # BLD: 3.42 M/UL — LOW (ref 4.7–6.1)
RBC # BLD: 3.45 M/UL — LOW (ref 4.7–6.1)
RBC # BLD: 3.81 M/UL — LOW (ref 4.7–6.1)
RBC # BLD: 3.85 M/UL — LOW (ref 4.7–6.1)
RBC # BLD: 3.88 M/UL — LOW (ref 4.7–6.1)
RBC # BLD: 3.9 M/UL — LOW (ref 4.7–6.1)
RBC # BLD: 4.14 M/UL — LOW (ref 4.7–6.1)
RBC # FLD: 14 % — SIGNIFICANT CHANGE UP (ref 11.5–14.5)
RBC # FLD: 14 % — SIGNIFICANT CHANGE UP (ref 11.5–14.5)
RBC # FLD: 14.1 % — SIGNIFICANT CHANGE UP (ref 11.5–14.5)
RBC # FLD: 14.1 % — SIGNIFICANT CHANGE UP (ref 11.5–14.5)
RBC # FLD: 14.2 % — SIGNIFICANT CHANGE UP (ref 11.5–14.5)
RBC # FLD: 14.2 % — SIGNIFICANT CHANGE UP (ref 11.5–14.5)
RBC # FLD: 14.3 % — SIGNIFICANT CHANGE UP (ref 11.5–14.5)
RBC # FLD: 14.4 % — SIGNIFICANT CHANGE UP (ref 11.5–14.5)
RBC # FLD: 14.6 % — HIGH (ref 11.5–14.5)
RBC # FLD: 14.6 % — HIGH (ref 11.5–14.5)
SAO2 % BLDA: 97 % — SIGNIFICANT CHANGE UP (ref 94–98)
SAO2 % BLDA: 99.8 % — HIGH (ref 94–98)
SODIUM SERPL-SCNC: 132 MMOL/L — LOW (ref 135–146)
SODIUM SERPL-SCNC: 136 MMOL/L — SIGNIFICANT CHANGE UP (ref 135–146)
SODIUM SERPL-SCNC: 136 MMOL/L — SIGNIFICANT CHANGE UP (ref 135–146)
SODIUM SERPL-SCNC: 137 MMOL/L — SIGNIFICANT CHANGE UP (ref 135–146)
SODIUM SERPL-SCNC: 138 MMOL/L — SIGNIFICANT CHANGE UP (ref 135–146)
SODIUM SERPL-SCNC: 141 MMOL/L — SIGNIFICANT CHANGE UP (ref 135–146)
SODIUM SERPL-SCNC: 142 MMOL/L — SIGNIFICANT CHANGE UP (ref 135–146)
SODIUM SERPL-SCNC: 145 MMOL/L — SIGNIFICANT CHANGE UP (ref 135–146)
SODIUM SERPL-SCNC: 145 MMOL/L — SIGNIFICANT CHANGE UP (ref 135–146)
TRIGL SERPL-MCNC: 115 MG/DL — SIGNIFICANT CHANGE UP
TROPONIN T SERPL-MCNC: 0.09 NG/ML — CRITICAL HIGH
TROPONIN T SERPL-MCNC: 0.15 NG/ML — CRITICAL HIGH
TROPONIN T SERPL-MCNC: 0.18 NG/ML — CRITICAL HIGH
TROPONIN T SERPL-MCNC: 0.19 NG/ML — CRITICAL HIGH
TROPONIN T SERPL-MCNC: 0.26 NG/ML — CRITICAL HIGH
TSH SERPL-MCNC: 2.9 UIU/ML — SIGNIFICANT CHANGE UP (ref 0.27–4.2)
WBC # BLD: 11.73 K/UL — HIGH (ref 4.8–10.8)
WBC # BLD: 17.42 K/UL — HIGH (ref 4.8–10.8)
WBC # BLD: 4.89 K/UL — SIGNIFICANT CHANGE UP (ref 4.8–10.8)
WBC # BLD: 5.2 K/UL — SIGNIFICANT CHANGE UP (ref 4.8–10.8)
WBC # BLD: 5.99 K/UL — SIGNIFICANT CHANGE UP (ref 4.8–10.8)
WBC # BLD: 6.01 K/UL — SIGNIFICANT CHANGE UP (ref 4.8–10.8)
WBC # BLD: 6.07 K/UL — SIGNIFICANT CHANGE UP (ref 4.8–10.8)
WBC # BLD: 6.5 K/UL — SIGNIFICANT CHANGE UP (ref 4.8–10.8)
WBC # BLD: 6.52 K/UL — SIGNIFICANT CHANGE UP (ref 4.8–10.8)
WBC # BLD: 6.59 K/UL — SIGNIFICANT CHANGE UP (ref 4.8–10.8)
WBC # BLD: 7.4 K/UL — SIGNIFICANT CHANGE UP (ref 4.8–10.8)
WBC # BLD: 7.97 K/UL — SIGNIFICANT CHANGE UP (ref 4.8–10.8)
WBC # BLD: 8.46 K/UL — SIGNIFICANT CHANGE UP (ref 4.8–10.8)
WBC # FLD AUTO: 11.73 K/UL — HIGH (ref 4.8–10.8)
WBC # FLD AUTO: 17.42 K/UL — HIGH (ref 4.8–10.8)
WBC # FLD AUTO: 4.89 K/UL — SIGNIFICANT CHANGE UP (ref 4.8–10.8)
WBC # FLD AUTO: 5.2 K/UL — SIGNIFICANT CHANGE UP (ref 4.8–10.8)
WBC # FLD AUTO: 5.99 K/UL — SIGNIFICANT CHANGE UP (ref 4.8–10.8)
WBC # FLD AUTO: 6.01 K/UL — SIGNIFICANT CHANGE UP (ref 4.8–10.8)
WBC # FLD AUTO: 6.07 K/UL — SIGNIFICANT CHANGE UP (ref 4.8–10.8)
WBC # FLD AUTO: 6.5 K/UL — SIGNIFICANT CHANGE UP (ref 4.8–10.8)
WBC # FLD AUTO: 6.52 K/UL — SIGNIFICANT CHANGE UP (ref 4.8–10.8)
WBC # FLD AUTO: 6.59 K/UL — SIGNIFICANT CHANGE UP (ref 4.8–10.8)
WBC # FLD AUTO: 7.4 K/UL — SIGNIFICANT CHANGE UP (ref 4.8–10.8)
WBC # FLD AUTO: 7.97 K/UL — SIGNIFICANT CHANGE UP (ref 4.8–10.8)
WBC # FLD AUTO: 8.46 K/UL — SIGNIFICANT CHANGE UP (ref 4.8–10.8)

## 2023-01-01 PROCEDURE — 99233 SBSQ HOSP IP/OBS HIGH 50: CPT

## 2023-01-01 PROCEDURE — 82553 CREATINE MB FRACTION: CPT

## 2023-01-01 PROCEDURE — 97530 THERAPEUTIC ACTIVITIES: CPT | Mod: GP

## 2023-01-01 PROCEDURE — 71046 X-RAY EXAM CHEST 2 VIEWS: CPT

## 2023-01-01 PROCEDURE — 80061 LIPID PANEL: CPT

## 2023-01-01 PROCEDURE — 71045 X-RAY EXAM CHEST 1 VIEW: CPT | Mod: 26

## 2023-01-01 PROCEDURE — C1769: CPT

## 2023-01-01 PROCEDURE — C1887: CPT

## 2023-01-01 PROCEDURE — 93458 L HRT ARTERY/VENTRICLE ANGIO: CPT | Mod: 26

## 2023-01-01 PROCEDURE — 93010 ELECTROCARDIOGRAM REPORT: CPT

## 2023-01-01 PROCEDURE — 86901 BLOOD TYPING SEROLOGIC RH(D): CPT

## 2023-01-01 PROCEDURE — 83036 HEMOGLOBIN GLYCOSYLATED A1C: CPT

## 2023-01-01 PROCEDURE — 83605 ASSAY OF LACTIC ACID: CPT

## 2023-01-01 PROCEDURE — 94660 CPAP INITIATION&MGMT: CPT

## 2023-01-01 PROCEDURE — 71045 X-RAY EXAM CHEST 1 VIEW: CPT

## 2023-01-01 PROCEDURE — 74230 X-RAY XM SWLNG FUNCJ C+: CPT

## 2023-01-01 PROCEDURE — 84484 ASSAY OF TROPONIN QUANT: CPT

## 2023-01-01 PROCEDURE — 86900 BLOOD TYPING SEROLOGIC ABO: CPT

## 2023-01-01 PROCEDURE — 36415 COLL VENOUS BLD VENIPUNCTURE: CPT

## 2023-01-01 PROCEDURE — 99285 EMERGENCY DEPT VISIT HI MDM: CPT

## 2023-01-01 PROCEDURE — 93010 ELECTROCARDIOGRAM REPORT: CPT | Mod: 76

## 2023-01-01 PROCEDURE — 85025 COMPLETE CBC W/AUTO DIFF WBC: CPT

## 2023-01-01 PROCEDURE — 92610 EVALUATE SWALLOWING FUNCTION: CPT | Mod: GN

## 2023-01-01 PROCEDURE — 71046 X-RAY EXAM CHEST 2 VIEWS: CPT | Mod: 26

## 2023-01-01 PROCEDURE — 85027 COMPLETE CBC AUTOMATED: CPT

## 2023-01-01 PROCEDURE — 83735 ASSAY OF MAGNESIUM: CPT

## 2023-01-01 PROCEDURE — 33208 INSRT HEART PM ATRIAL & VENT: CPT

## 2023-01-01 PROCEDURE — 85730 THROMBOPLASTIN TIME PARTIAL: CPT

## 2023-01-01 PROCEDURE — 93458 L HRT ARTERY/VENTRICLE ANGIO: CPT

## 2023-01-01 PROCEDURE — 92526 ORAL FUNCTION THERAPY: CPT | Mod: GN

## 2023-01-01 PROCEDURE — 93280 PM DEVICE PROGR EVAL DUAL: CPT | Mod: 26

## 2023-01-01 PROCEDURE — C1894: CPT

## 2023-01-01 PROCEDURE — 93306 TTE W/DOPPLER COMPLETE: CPT | Mod: 26

## 2023-01-01 PROCEDURE — 74230 X-RAY XM SWLNG FUNCJ C+: CPT | Mod: 26

## 2023-01-01 PROCEDURE — 99223 1ST HOSP IP/OBS HIGH 75: CPT | Mod: 57

## 2023-01-01 PROCEDURE — 97163 PT EVAL HIGH COMPLEX 45 MIN: CPT | Mod: GP

## 2023-01-01 PROCEDURE — 85610 PROTHROMBIN TIME: CPT

## 2023-01-01 PROCEDURE — C1892: CPT

## 2023-01-01 PROCEDURE — 93005 ELECTROCARDIOGRAM TRACING: CPT

## 2023-01-01 PROCEDURE — 92611 MOTION FLUOROSCOPY/SWALLOW: CPT | Mod: GN

## 2023-01-01 PROCEDURE — 93010 ELECTROCARDIOGRAM REPORT: CPT | Mod: 77

## 2023-01-01 PROCEDURE — C1898: CPT

## 2023-01-01 PROCEDURE — C1785: CPT

## 2023-01-01 PROCEDURE — 82962 GLUCOSE BLOOD TEST: CPT

## 2023-01-01 PROCEDURE — 80048 BASIC METABOLIC PNL TOTAL CA: CPT

## 2023-01-01 PROCEDURE — 93306 TTE W/DOPPLER COMPLETE: CPT

## 2023-01-01 PROCEDURE — 80053 COMPREHEN METABOLIC PANEL: CPT

## 2023-01-01 PROCEDURE — 82803 BLOOD GASES ANY COMBINATION: CPT

## 2023-01-01 PROCEDURE — 84443 ASSAY THYROID STIM HORMONE: CPT

## 2023-01-01 PROCEDURE — 86850 RBC ANTIBODY SCREEN: CPT

## 2023-01-01 PROCEDURE — 93280 PM DEVICE PROGR EVAL DUAL: CPT

## 2023-01-01 RX ORDER — METOPROLOL TARTRATE 50 MG
12.5 TABLET ORAL
Refills: 0 | Status: DISCONTINUED | OUTPATIENT
Start: 2023-01-01 | End: 2023-01-01

## 2023-01-01 RX ORDER — DEXTROSE 50 % IN WATER 50 %
25 SYRINGE (ML) INTRAVENOUS ONCE
Refills: 0 | Status: DISCONTINUED | OUTPATIENT
Start: 2023-01-01 | End: 2023-01-01

## 2023-01-01 RX ORDER — FINASTERIDE 5 MG/1
1 TABLET, FILM COATED ORAL
Refills: 0 | DISCHARGE

## 2023-01-01 RX ORDER — APIXABAN 2.5 MG/1
1 TABLET, FILM COATED ORAL
Refills: 0 | DISCHARGE

## 2023-01-01 RX ORDER — DEXTROSE 50 % IN WATER 50 %
15 SYRINGE (ML) INTRAVENOUS ONCE
Refills: 0 | Status: DISCONTINUED | OUTPATIENT
Start: 2023-01-01 | End: 2023-01-01

## 2023-01-01 RX ORDER — ISOSORBIDE MONONITRATE 60 MG/1
60 TABLET, EXTENDED RELEASE ORAL DAILY
Refills: 0 | Status: DISCONTINUED | OUTPATIENT
Start: 2023-01-01 | End: 2023-01-01

## 2023-01-01 RX ORDER — FUROSEMIDE 40 MG
20 TABLET ORAL DAILY
Refills: 0 | Status: DISCONTINUED | OUTPATIENT
Start: 2023-01-01 | End: 2023-01-01

## 2023-01-01 RX ORDER — HYDROMORPHONE HYDROCHLORIDE 2 MG/ML
0.5 INJECTION INTRAMUSCULAR; INTRAVENOUS; SUBCUTANEOUS
Refills: 0 | Status: DISCONTINUED | OUTPATIENT
Start: 2023-01-01 | End: 2023-01-01

## 2023-01-01 RX ORDER — DOXAZOSIN MESYLATE 4 MG
4 TABLET ORAL AT BEDTIME
Refills: 0 | Status: DISCONTINUED | OUTPATIENT
Start: 2023-01-01 | End: 2023-01-01

## 2023-01-01 RX ORDER — TERAZOSIN HYDROCHLORIDE 10 MG/1
0 CAPSULE ORAL
Refills: 0 | DISCHARGE

## 2023-01-01 RX ORDER — ACETYLCYSTEINE 200 MG/ML
4 VIAL (ML) MISCELLANEOUS ONCE
Refills: 0 | Status: COMPLETED | OUTPATIENT
Start: 2023-01-01 | End: 2023-01-01

## 2023-01-01 RX ORDER — ISOSORBIDE MONONITRATE 60 MG/1
30 TABLET, EXTENDED RELEASE ORAL DAILY
Refills: 0 | Status: DISCONTINUED | OUTPATIENT
Start: 2023-01-01 | End: 2023-01-01

## 2023-01-01 RX ORDER — INSULIN LISPRO 100/ML
VIAL (ML) SUBCUTANEOUS
Refills: 0 | Status: DISCONTINUED | OUTPATIENT
Start: 2023-01-01 | End: 2023-01-01

## 2023-01-01 RX ORDER — NITROGLYCERIN 6.5 MG
10 CAPSULE, EXTENDED RELEASE ORAL
Qty: 50 | Refills: 0 | Status: DISCONTINUED | OUTPATIENT
Start: 2023-01-01 | End: 2023-01-01

## 2023-01-01 RX ORDER — ATORVASTATIN CALCIUM 80 MG/1
40 TABLET, FILM COATED ORAL AT BEDTIME
Refills: 0 | Status: DISCONTINUED | OUTPATIENT
Start: 2023-01-01 | End: 2023-01-01

## 2023-01-01 RX ORDER — HEPARIN SODIUM 5000 [USP'U]/ML
5000 INJECTION INTRAVENOUS; SUBCUTANEOUS EVERY 6 HOURS
Refills: 0 | Status: DISCONTINUED | OUTPATIENT
Start: 2023-01-01 | End: 2023-01-01

## 2023-01-01 RX ORDER — ACETAMINOPHEN 500 MG
650 TABLET ORAL EVERY 6 HOURS
Refills: 0 | Status: DISCONTINUED | OUTPATIENT
Start: 2023-01-01 | End: 2023-01-01

## 2023-01-01 RX ORDER — HEPARIN SODIUM 5000 [USP'U]/ML
INJECTION INTRAVENOUS; SUBCUTANEOUS
Qty: 25000 | Refills: 0 | Status: DISCONTINUED | OUTPATIENT
Start: 2023-01-01 | End: 2023-01-01

## 2023-01-01 RX ORDER — NITROGLYCERIN 6.5 MG
5 CAPSULE, EXTENDED RELEASE ORAL
Qty: 50 | Refills: 0 | Status: DISCONTINUED | OUTPATIENT
Start: 2023-01-01 | End: 2023-01-01

## 2023-01-01 RX ORDER — ASPIRIN/CALCIUM CARB/MAGNESIUM 324 MG
81 TABLET ORAL DAILY
Refills: 0 | Status: DISCONTINUED | OUTPATIENT
Start: 2023-01-01 | End: 2023-01-01

## 2023-01-01 RX ORDER — SODIUM CHLORIDE 9 MG/ML
1000 INJECTION, SOLUTION INTRAVENOUS
Refills: 0 | Status: DISCONTINUED | OUTPATIENT
Start: 2023-01-01 | End: 2023-01-01

## 2023-01-01 RX ORDER — METOPROLOL TARTRATE 50 MG
25 TABLET ORAL
Refills: 0 | Status: DISCONTINUED | OUTPATIENT
Start: 2023-01-01 | End: 2023-01-01

## 2023-01-01 RX ORDER — CHLORHEXIDINE GLUCONATE 213 G/1000ML
1 SOLUTION TOPICAL
Refills: 0 | Status: DISCONTINUED | OUTPATIENT
Start: 2023-01-01 | End: 2023-01-01

## 2023-01-01 RX ORDER — FOLIC ACID 0.8 MG
1 TABLET ORAL DAILY
Refills: 0 | Status: DISCONTINUED | OUTPATIENT
Start: 2023-01-01 | End: 2023-01-01

## 2023-01-01 RX ORDER — AMLODIPINE BESYLATE 2.5 MG/1
2.5 TABLET ORAL AT BEDTIME
Refills: 0 | Status: DISCONTINUED | OUTPATIENT
Start: 2023-01-01 | End: 2023-01-01

## 2023-01-01 RX ORDER — CHOLECALCIFEROL (VITAMIN D3) 125 MCG
1 CAPSULE ORAL
Refills: 0 | DISCHARGE

## 2023-01-01 RX ORDER — HYDROMORPHONE HYDROCHLORIDE 2 MG/ML
1 INJECTION INTRAMUSCULAR; INTRAVENOUS; SUBCUTANEOUS
Refills: 0 | Status: DISCONTINUED | OUTPATIENT
Start: 2023-01-01 | End: 2023-01-01

## 2023-01-01 RX ORDER — PANTOPRAZOLE SODIUM 20 MG/1
40 TABLET, DELAYED RELEASE ORAL
Refills: 0 | Status: DISCONTINUED | OUTPATIENT
Start: 2023-01-01 | End: 2023-01-01

## 2023-01-01 RX ORDER — SENNA PLUS 8.6 MG/1
2 TABLET ORAL AT BEDTIME
Refills: 0 | Status: DISCONTINUED | OUTPATIENT
Start: 2023-01-01 | End: 2023-01-01

## 2023-01-01 RX ORDER — RANOLAZINE 500 MG/1
500 TABLET, FILM COATED, EXTENDED RELEASE ORAL
Refills: 0 | Status: DISCONTINUED | OUTPATIENT
Start: 2023-01-01 | End: 2023-01-01

## 2023-01-01 RX ORDER — NITROGLYCERIN 6.5 MG
0.4 CAPSULE, EXTENDED RELEASE ORAL
Refills: 0 | Status: DISCONTINUED | OUTPATIENT
Start: 2023-01-01 | End: 2023-01-01

## 2023-01-01 RX ORDER — ASPIRIN/CALCIUM CARB/MAGNESIUM 324 MG
324 TABLET ORAL ONCE
Refills: 0 | Status: COMPLETED | OUTPATIENT
Start: 2023-01-01 | End: 2023-01-01

## 2023-01-01 RX ORDER — FUROSEMIDE 40 MG
40 TABLET ORAL ONCE
Refills: 0 | Status: COMPLETED | OUTPATIENT
Start: 2023-01-01 | End: 2023-01-01

## 2023-01-01 RX ORDER — FINASTERIDE 5 MG/1
5 TABLET, FILM COATED ORAL DAILY
Refills: 0 | Status: DISCONTINUED | OUTPATIENT
Start: 2023-01-01 | End: 2023-01-01

## 2023-01-01 RX ORDER — ASPIRIN/CALCIUM CARB/MAGNESIUM 324 MG
0 TABLET ORAL
Refills: 0 | DISCHARGE

## 2023-01-01 RX ORDER — DEXTROSE 50 % IN WATER 50 %
12.5 SYRINGE (ML) INTRAVENOUS ONCE
Refills: 0 | Status: DISCONTINUED | OUTPATIENT
Start: 2023-01-01 | End: 2023-01-01

## 2023-01-01 RX ORDER — CLOPIDOGREL BISULFATE 75 MG/1
75 TABLET, FILM COATED ORAL DAILY
Refills: 0 | Status: DISCONTINUED | OUTPATIENT
Start: 2023-01-01 | End: 2023-01-01

## 2023-01-01 RX ORDER — ACETAMINOPHEN 500 MG
1000 TABLET ORAL ONCE
Refills: 0 | Status: COMPLETED | OUTPATIENT
Start: 2023-01-01 | End: 2023-01-01

## 2023-01-01 RX ORDER — POLYETHYLENE GLYCOL 3350 17 G/17G
17 POWDER, FOR SOLUTION ORAL DAILY
Refills: 0 | Status: DISCONTINUED | OUTPATIENT
Start: 2023-01-01 | End: 2023-01-01

## 2023-01-01 RX ORDER — FOLIC ACID 0.8 MG
1 TABLET ORAL
Refills: 0 | DISCHARGE

## 2023-01-01 RX ORDER — ACETAMINOPHEN 500 MG
650 TABLET ORAL ONCE
Refills: 0 | Status: COMPLETED | OUTPATIENT
Start: 2023-01-01 | End: 2023-01-01

## 2023-01-01 RX ORDER — LANOLIN ALCOHOL/MO/W.PET/CERES
1 CREAM (GRAM) TOPICAL
Refills: 0 | DISCHARGE

## 2023-01-01 RX ORDER — METOPROLOL TARTRATE 50 MG
50 TABLET ORAL
Refills: 0 | Status: DISCONTINUED | OUTPATIENT
Start: 2023-01-01 | End: 2023-01-01

## 2023-01-01 RX ORDER — GLUCAGON INJECTION, SOLUTION 0.5 MG/.1ML
1 INJECTION, SOLUTION SUBCUTANEOUS ONCE
Refills: 0 | Status: DISCONTINUED | OUTPATIENT
Start: 2023-01-01 | End: 2023-01-01

## 2023-01-01 RX ORDER — TAMSULOSIN HYDROCHLORIDE 0.4 MG/1
0.4 CAPSULE ORAL AT BEDTIME
Refills: 0 | Status: DISCONTINUED | OUTPATIENT
Start: 2023-01-01 | End: 2023-01-01

## 2023-01-01 RX ORDER — HEPARIN SODIUM 5000 [USP'U]/ML
550 INJECTION INTRAVENOUS; SUBCUTANEOUS
Qty: 25000 | Refills: 0 | Status: DISCONTINUED | OUTPATIENT
Start: 2023-01-01 | End: 2023-01-01

## 2023-01-01 RX ORDER — VANCOMYCIN HCL 1 G
1000 VIAL (EA) INTRAVENOUS EVERY 12 HOURS
Refills: 0 | Status: COMPLETED | OUTPATIENT
Start: 2023-01-01 | End: 2023-01-01

## 2023-01-01 RX ORDER — FUROSEMIDE 40 MG
1 TABLET ORAL
Refills: 0 | DISCHARGE

## 2023-01-01 RX ORDER — CLOPIDOGREL BISULFATE 75 MG/1
300 TABLET, FILM COATED ORAL ONCE
Refills: 0 | Status: COMPLETED | OUTPATIENT
Start: 2023-01-01 | End: 2023-01-01

## 2023-01-01 RX ORDER — SIMVASTATIN 20 MG/1
1 TABLET, FILM COATED ORAL
Refills: 0 | DISCHARGE

## 2023-01-01 RX ORDER — MORPHINE SULFATE 50 MG/1
2 CAPSULE, EXTENDED RELEASE ORAL ONCE
Refills: 0 | Status: DISCONTINUED | OUTPATIENT
Start: 2023-01-01 | End: 2023-01-01

## 2023-01-01 RX ORDER — CLOPIDOGREL BISULFATE 75 MG/1
1 TABLET, FILM COATED ORAL
Refills: 0 | DISCHARGE

## 2023-01-01 RX ORDER — MORPHINE SULFATE 50 MG/1
1 CAPSULE, EXTENDED RELEASE ORAL ONCE
Refills: 0 | Status: DISCONTINUED | OUTPATIENT
Start: 2023-01-01 | End: 2023-01-01

## 2023-01-01 RX ORDER — ISOSORBIDE MONONITRATE 60 MG/1
90 TABLET, EXTENDED RELEASE ORAL DAILY
Refills: 0 | Status: DISCONTINUED | OUTPATIENT
Start: 2023-01-01 | End: 2023-01-01

## 2023-01-01 RX ORDER — SCOPALAMINE 1 MG/3D
1 PATCH, EXTENDED RELEASE TRANSDERMAL
Refills: 0 | Status: DISCONTINUED | OUTPATIENT
Start: 2023-01-01 | End: 2023-01-01

## 2023-01-01 RX ORDER — LOSARTAN POTASSIUM 100 MG/1
50 TABLET, FILM COATED ORAL ONCE
Refills: 0 | Status: COMPLETED | OUTPATIENT
Start: 2023-01-01 | End: 2023-01-01

## 2023-01-01 RX ORDER — LOSARTAN POTASSIUM 100 MG/1
50 TABLET, FILM COATED ORAL DAILY
Refills: 0 | Status: DISCONTINUED | OUTPATIENT
Start: 2023-01-01 | End: 2023-01-01

## 2023-01-01 RX ORDER — FAMOTIDINE 10 MG/ML
1 INJECTION INTRAVENOUS
Refills: 0 | DISCHARGE

## 2023-01-01 RX ORDER — ENOXAPARIN SODIUM 100 MG/ML
40 INJECTION SUBCUTANEOUS EVERY 24 HOURS
Refills: 0 | Status: DISCONTINUED | OUTPATIENT
Start: 2023-01-01 | End: 2023-01-01

## 2023-01-01 RX ORDER — CHOLECALCIFEROL (VITAMIN D3) 125 MCG
1000 CAPSULE ORAL DAILY
Refills: 0 | Status: DISCONTINUED | OUTPATIENT
Start: 2023-01-01 | End: 2023-01-01

## 2023-01-01 RX ORDER — LANOLIN ALCOHOL/MO/W.PET/CERES
5 CREAM (GRAM) TOPICAL AT BEDTIME
Refills: 0 | Status: DISCONTINUED | OUTPATIENT
Start: 2023-01-01 | End: 2023-01-01

## 2023-01-01 RX ORDER — SODIUM CHLORIDE 9 MG/ML
1000 INJECTION, SOLUTION INTRAVENOUS ONCE
Refills: 0 | Status: COMPLETED | OUTPATIENT
Start: 2023-01-01 | End: 2023-01-01

## 2023-01-01 RX ADMIN — Medication 4 MILLIGRAM(S): at 21:34

## 2023-01-01 RX ADMIN — SENNA PLUS 2 TABLET(S): 8.6 TABLET ORAL at 21:13

## 2023-01-01 RX ADMIN — Medication 81 MILLIGRAM(S): at 12:56

## 2023-01-01 RX ADMIN — Medication 1000 UNIT(S): at 12:59

## 2023-01-01 RX ADMIN — ATORVASTATIN CALCIUM 40 MILLIGRAM(S): 80 TABLET, FILM COATED ORAL at 21:55

## 2023-01-01 RX ADMIN — PANTOPRAZOLE SODIUM 40 MILLIGRAM(S): 20 TABLET, DELAYED RELEASE ORAL at 05:33

## 2023-01-01 RX ADMIN — Medication 650 MILLIGRAM(S): at 14:45

## 2023-01-01 RX ADMIN — Medication 50 MILLIGRAM(S): at 05:28

## 2023-01-01 RX ADMIN — Medication 650 MILLIGRAM(S): at 22:00

## 2023-01-01 RX ADMIN — Medication 1 MILLIGRAM(S): at 12:39

## 2023-01-01 RX ADMIN — ISOSORBIDE MONONITRATE 60 MILLIGRAM(S): 60 TABLET, EXTENDED RELEASE ORAL at 11:49

## 2023-01-01 RX ADMIN — Medication 1 MILLIGRAM(S): at 13:13

## 2023-01-01 RX ADMIN — ISOSORBIDE MONONITRATE 30 MILLIGRAM(S): 60 TABLET, EXTENDED RELEASE ORAL at 11:21

## 2023-01-01 RX ADMIN — PANTOPRAZOLE SODIUM 40 MILLIGRAM(S): 20 TABLET, DELAYED RELEASE ORAL at 05:28

## 2023-01-01 RX ADMIN — SODIUM CHLORIDE 1000 MILLILITER(S): 9 INJECTION, SOLUTION INTRAVENOUS at 05:10

## 2023-01-01 RX ADMIN — HEPARIN SODIUM 0 UNIT(S)/HR: 5000 INJECTION INTRAVENOUS; SUBCUTANEOUS at 06:48

## 2023-01-01 RX ADMIN — Medication 1 MILLIGRAM(S): at 11:20

## 2023-01-01 RX ADMIN — Medication 81 MILLIGRAM(S): at 08:37

## 2023-01-01 RX ADMIN — SENNA PLUS 2 TABLET(S): 8.6 TABLET ORAL at 21:31

## 2023-01-01 RX ADMIN — SENNA PLUS 2 TABLET(S): 8.6 TABLET ORAL at 21:34

## 2023-01-01 RX ADMIN — Medication 650 MILLIGRAM(S): at 21:30

## 2023-01-01 RX ADMIN — Medication 2: at 17:25

## 2023-01-01 RX ADMIN — Medication 1 TABLET(S): at 11:21

## 2023-01-01 RX ADMIN — Medication 50 MILLIGRAM(S): at 17:23

## 2023-01-01 RX ADMIN — Medication 1 MILLIGRAM(S): at 11:41

## 2023-01-01 RX ADMIN — Medication 20 MILLIGRAM(S): at 05:18

## 2023-01-01 RX ADMIN — Medication 4: at 08:28

## 2023-01-01 RX ADMIN — Medication 1.5 MICROGRAM(S)/MIN: at 01:20

## 2023-01-01 RX ADMIN — FINASTERIDE 5 MILLIGRAM(S): 5 TABLET, FILM COATED ORAL at 11:21

## 2023-01-01 RX ADMIN — Medication 650 MILLIGRAM(S): at 12:05

## 2023-01-01 RX ADMIN — Medication 20 MILLIGRAM(S): at 17:00

## 2023-01-01 RX ADMIN — Medication 50 MILLIGRAM(S): at 17:18

## 2023-01-01 RX ADMIN — MORPHINE SULFATE 2 MILLIGRAM(S): 50 CAPSULE, EXTENDED RELEASE ORAL at 22:47

## 2023-01-01 RX ADMIN — Medication 20 MILLIGRAM(S): at 12:58

## 2023-01-01 RX ADMIN — Medication 4: at 13:06

## 2023-01-01 RX ADMIN — Medication 1 MILLIGRAM(S): at 12:59

## 2023-01-01 RX ADMIN — CHLORHEXIDINE GLUCONATE 1 APPLICATION(S): 213 SOLUTION TOPICAL at 06:00

## 2023-01-01 RX ADMIN — HEPARIN SODIUM 0 UNIT(S)/HR: 5000 INJECTION INTRAVENOUS; SUBCUTANEOUS at 21:58

## 2023-01-01 RX ADMIN — FINASTERIDE 5 MILLIGRAM(S): 5 TABLET, FILM COATED ORAL at 12:39

## 2023-01-01 RX ADMIN — Medication 81 MILLIGRAM(S): at 12:39

## 2023-01-01 RX ADMIN — Medication 5 MILLIGRAM(S): at 21:29

## 2023-01-01 RX ADMIN — Medication 5 MILLIGRAM(S): at 22:47

## 2023-01-01 RX ADMIN — Medication 6: at 17:41

## 2023-01-01 RX ADMIN — Medication 1000 UNIT(S): at 11:49

## 2023-01-01 RX ADMIN — PANTOPRAZOLE SODIUM 40 MILLIGRAM(S): 20 TABLET, DELAYED RELEASE ORAL at 05:04

## 2023-01-01 RX ADMIN — Medication 50 MILLIGRAM(S): at 18:14

## 2023-01-01 RX ADMIN — ISOSORBIDE MONONITRATE 60 MILLIGRAM(S): 60 TABLET, EXTENDED RELEASE ORAL at 11:57

## 2023-01-01 RX ADMIN — Medication 2: at 17:41

## 2023-01-01 RX ADMIN — Medication 1000 UNIT(S): at 12:39

## 2023-01-01 RX ADMIN — CLOPIDOGREL BISULFATE 300 MILLIGRAM(S): 75 TABLET, FILM COATED ORAL at 22:45

## 2023-01-01 RX ADMIN — POLYETHYLENE GLYCOL 3350 17 GRAM(S): 17 POWDER, FOR SOLUTION ORAL at 11:50

## 2023-01-01 RX ADMIN — ATORVASTATIN CALCIUM 40 MILLIGRAM(S): 80 TABLET, FILM COATED ORAL at 21:34

## 2023-01-01 RX ADMIN — HEPARIN SODIUM 1000 UNIT(S)/HR: 5000 INJECTION INTRAVENOUS; SUBCUTANEOUS at 00:42

## 2023-01-01 RX ADMIN — Medication 40 MILLIGRAM(S): at 23:26

## 2023-01-01 RX ADMIN — Medication 4 MILLIGRAM(S): at 21:30

## 2023-01-01 RX ADMIN — CHLORHEXIDINE GLUCONATE 1 APPLICATION(S): 213 SOLUTION TOPICAL at 05:21

## 2023-01-01 RX ADMIN — FINASTERIDE 5 MILLIGRAM(S): 5 TABLET, FILM COATED ORAL at 11:41

## 2023-01-01 RX ADMIN — Medication 20 MILLIGRAM(S): at 06:02

## 2023-01-01 RX ADMIN — Medication 650 MILLIGRAM(S): at 05:59

## 2023-01-01 RX ADMIN — CHLORHEXIDINE GLUCONATE 1 APPLICATION(S): 213 SOLUTION TOPICAL at 12:40

## 2023-01-01 RX ADMIN — Medication 1000 UNIT(S): at 11:21

## 2023-01-01 RX ADMIN — SENNA PLUS 2 TABLET(S): 8.6 TABLET ORAL at 22:47

## 2023-01-01 RX ADMIN — POLYETHYLENE GLYCOL 3350 17 GRAM(S): 17 POWDER, FOR SOLUTION ORAL at 11:22

## 2023-01-01 RX ADMIN — Medication 2: at 08:34

## 2023-01-01 RX ADMIN — LOSARTAN POTASSIUM 50 MILLIGRAM(S): 100 TABLET, FILM COATED ORAL at 06:02

## 2023-01-01 RX ADMIN — Medication 1000 MILLIGRAM(S): at 15:15

## 2023-01-01 RX ADMIN — ISOSORBIDE MONONITRATE 30 MILLIGRAM(S): 60 TABLET, EXTENDED RELEASE ORAL at 17:53

## 2023-01-01 RX ADMIN — Medication 1 TABLET(S): at 13:00

## 2023-01-01 RX ADMIN — Medication 1: at 18:00

## 2023-01-01 RX ADMIN — FINASTERIDE 5 MILLIGRAM(S): 5 TABLET, FILM COATED ORAL at 12:59

## 2023-01-01 RX ADMIN — Medication 1 TABLET(S): at 12:40

## 2023-01-01 RX ADMIN — PANTOPRAZOLE SODIUM 40 MILLIGRAM(S): 20 TABLET, DELAYED RELEASE ORAL at 05:25

## 2023-01-01 RX ADMIN — LOSARTAN POTASSIUM 50 MILLIGRAM(S): 100 TABLET, FILM COATED ORAL at 23:34

## 2023-01-01 RX ADMIN — Medication 4: at 18:13

## 2023-01-01 RX ADMIN — ATORVASTATIN CALCIUM 40 MILLIGRAM(S): 80 TABLET, FILM COATED ORAL at 21:31

## 2023-01-01 RX ADMIN — Medication 81 MILLIGRAM(S): at 12:59

## 2023-01-01 RX ADMIN — HEPARIN SODIUM 0 UNIT(S)/HR: 5000 INJECTION INTRAVENOUS; SUBCUTANEOUS at 19:06

## 2023-01-01 RX ADMIN — FINASTERIDE 5 MILLIGRAM(S): 5 TABLET, FILM COATED ORAL at 11:50

## 2023-01-01 RX ADMIN — Medication 25 MILLIGRAM(S): at 05:36

## 2023-01-01 RX ADMIN — ATORVASTATIN CALCIUM 40 MILLIGRAM(S): 80 TABLET, FILM COATED ORAL at 22:07

## 2023-01-01 RX ADMIN — FINASTERIDE 5 MILLIGRAM(S): 5 TABLET, FILM COATED ORAL at 13:13

## 2023-01-01 RX ADMIN — Medication 1000 UNIT(S): at 13:13

## 2023-01-01 RX ADMIN — AMLODIPINE BESYLATE 2.5 MILLIGRAM(S): 2.5 TABLET ORAL at 22:04

## 2023-01-01 RX ADMIN — Medication 20 MILLIGRAM(S): at 11:41

## 2023-01-01 RX ADMIN — Medication 81 MILLIGRAM(S): at 11:49

## 2023-01-01 RX ADMIN — Medication 1 TABLET(S): at 13:13

## 2023-01-01 RX ADMIN — Medication 400 MILLIGRAM(S): at 23:26

## 2023-01-01 RX ADMIN — Medication 20 MILLIGRAM(S): at 12:56

## 2023-01-01 RX ADMIN — Medication 1000 MILLIGRAM(S): at 20:36

## 2023-01-01 RX ADMIN — Medication 20 MILLIGRAM(S): at 05:35

## 2023-01-01 RX ADMIN — Medication 20 MILLIGRAM(S): at 11:49

## 2023-01-01 RX ADMIN — Medication 1000 UNIT(S): at 11:41

## 2023-01-01 RX ADMIN — CLOPIDOGREL BISULFATE 75 MILLIGRAM(S): 75 TABLET, FILM COATED ORAL at 12:40

## 2023-01-01 RX ADMIN — Medication 5 MILLIGRAM(S): at 21:14

## 2023-01-01 RX ADMIN — Medication 5 MILLIGRAM(S): at 21:05

## 2023-01-01 RX ADMIN — LOSARTAN POTASSIUM 50 MILLIGRAM(S): 100 TABLET, FILM COATED ORAL at 05:35

## 2023-01-01 RX ADMIN — Medication 324 MILLIGRAM(S): at 22:45

## 2023-01-01 RX ADMIN — RANOLAZINE 500 MILLIGRAM(S): 500 TABLET, FILM COATED, EXTENDED RELEASE ORAL at 17:23

## 2023-01-01 RX ADMIN — POLYETHYLENE GLYCOL 3350 17 GRAM(S): 17 POWDER, FOR SOLUTION ORAL at 21:04

## 2023-01-01 RX ADMIN — HEPARIN SODIUM 300 UNIT(S)/HR: 5000 INJECTION INTRAVENOUS; SUBCUTANEOUS at 08:02

## 2023-01-01 RX ADMIN — CLOPIDOGREL BISULFATE 75 MILLIGRAM(S): 75 TABLET, FILM COATED ORAL at 11:49

## 2023-01-01 RX ADMIN — Medication 650 MILLIGRAM(S): at 19:26

## 2023-01-01 RX ADMIN — Medication 81 MILLIGRAM(S): at 11:41

## 2023-01-01 RX ADMIN — Medication 5 MILLIGRAM(S): at 21:34

## 2023-01-01 RX ADMIN — CHLORHEXIDINE GLUCONATE 1 APPLICATION(S): 213 SOLUTION TOPICAL at 05:18

## 2023-01-01 RX ADMIN — Medication 250 MILLIGRAM(S): at 21:36

## 2023-01-01 RX ADMIN — Medication 4: at 12:17

## 2023-01-01 RX ADMIN — ATORVASTATIN CALCIUM 40 MILLIGRAM(S): 80 TABLET, FILM COATED ORAL at 21:14

## 2023-01-01 RX ADMIN — TAMSULOSIN HYDROCHLORIDE 0.4 MILLIGRAM(S): 0.4 CAPSULE ORAL at 21:20

## 2023-01-01 RX ADMIN — Medication 1: at 12:41

## 2023-01-01 RX ADMIN — Medication 20 MILLIGRAM(S): at 05:03

## 2023-01-01 RX ADMIN — Medication 650 MILLIGRAM(S): at 06:29

## 2023-01-01 RX ADMIN — Medication 50 MILLIGRAM(S): at 05:59

## 2023-01-01 RX ADMIN — Medication 1000 MILLIGRAM(S): at 23:56

## 2023-01-01 RX ADMIN — TAMSULOSIN HYDROCHLORIDE 0.4 MILLIGRAM(S): 0.4 CAPSULE ORAL at 22:07

## 2023-01-01 RX ADMIN — Medication 1: at 17:01

## 2023-01-01 RX ADMIN — Medication 0.4 MILLIGRAM(S): at 04:19

## 2023-01-01 RX ADMIN — Medication 4 MILLIGRAM(S): at 21:04

## 2023-01-01 RX ADMIN — Medication 250 MILLIGRAM(S): at 09:00

## 2023-01-01 RX ADMIN — ENOXAPARIN SODIUM 40 MILLIGRAM(S): 100 INJECTION SUBCUTANEOUS at 21:03

## 2023-01-01 RX ADMIN — Medication 400 MILLIGRAM(S): at 14:45

## 2023-01-01 RX ADMIN — Medication 1: at 07:33

## 2023-01-01 RX ADMIN — CLOPIDOGREL BISULFATE 75 MILLIGRAM(S): 75 TABLET, FILM COATED ORAL at 08:37

## 2023-01-01 RX ADMIN — CLOPIDOGREL BISULFATE 75 MILLIGRAM(S): 75 TABLET, FILM COATED ORAL at 11:41

## 2023-01-01 RX ADMIN — Medication 1 TABLET(S): at 11:41

## 2023-01-01 RX ADMIN — HEPARIN SODIUM 550 UNIT(S)/HR: 5000 INJECTION INTRAVENOUS; SUBCUTANEOUS at 22:46

## 2023-01-01 RX ADMIN — Medication 81 MILLIGRAM(S): at 13:12

## 2023-01-01 RX ADMIN — MORPHINE SULFATE 1 MILLIGRAM(S): 50 CAPSULE, EXTENDED RELEASE ORAL at 05:03

## 2023-01-01 RX ADMIN — Medication 400 MILLIGRAM(S): at 20:06

## 2023-01-01 RX ADMIN — CLOPIDOGREL BISULFATE 75 MILLIGRAM(S): 75 TABLET, FILM COATED ORAL at 12:56

## 2023-01-01 RX ADMIN — Medication 20 MILLIGRAM(S): at 18:00

## 2023-01-01 RX ADMIN — Medication 20 MILLIGRAM(S): at 05:20

## 2023-01-01 RX ADMIN — SCOPALAMINE 1 PATCH: 1 PATCH, EXTENDED RELEASE TRANSDERMAL at 22:07

## 2023-01-01 RX ADMIN — ISOSORBIDE MONONITRATE 60 MILLIGRAM(S): 60 TABLET, EXTENDED RELEASE ORAL at 22:42

## 2023-01-01 RX ADMIN — ATORVASTATIN CALCIUM 40 MILLIGRAM(S): 80 TABLET, FILM COATED ORAL at 22:47

## 2023-01-01 RX ADMIN — CHLORHEXIDINE GLUCONATE 1 APPLICATION(S): 213 SOLUTION TOPICAL at 05:28

## 2023-01-01 RX ADMIN — FINASTERIDE 5 MILLIGRAM(S): 5 TABLET, FILM COATED ORAL at 12:56

## 2023-01-01 RX ADMIN — Medication 25 MILLIGRAM(S): at 17:01

## 2023-01-01 RX ADMIN — Medication 2: at 08:14

## 2023-01-01 RX ADMIN — Medication 5 MILLIGRAM(S): at 21:55

## 2023-01-01 RX ADMIN — CLOPIDOGREL BISULFATE 75 MILLIGRAM(S): 75 TABLET, FILM COATED ORAL at 19:13

## 2023-01-01 RX ADMIN — Medication 4 MILLIGRAM(S): at 21:55

## 2023-01-01 RX ADMIN — Medication 20 MILLIGRAM(S): at 11:21

## 2023-01-01 RX ADMIN — Medication 1 MILLIGRAM(S): at 11:50

## 2023-01-01 RX ADMIN — Medication 50 MILLIGRAM(S): at 17:42

## 2023-01-01 RX ADMIN — CHLORHEXIDINE GLUCONATE 1 APPLICATION(S): 213 SOLUTION TOPICAL at 05:33

## 2023-01-01 RX ADMIN — Medication 3 MICROGRAM(S)/MIN: at 01:42

## 2023-01-01 RX ADMIN — PANTOPRAZOLE SODIUM 40 MILLIGRAM(S): 20 TABLET, DELAYED RELEASE ORAL at 06:02

## 2023-01-01 RX ADMIN — Medication 50 MILLIGRAM(S): at 05:18

## 2023-01-01 RX ADMIN — ATORVASTATIN CALCIUM 40 MILLIGRAM(S): 80 TABLET, FILM COATED ORAL at 21:04

## 2023-01-01 RX ADMIN — Medication 20 MILLIGRAM(S): at 05:30

## 2023-01-01 RX ADMIN — AMLODIPINE BESYLATE 2.5 MILLIGRAM(S): 2.5 TABLET ORAL at 22:28

## 2023-01-01 RX ADMIN — CLOPIDOGREL BISULFATE 75 MILLIGRAM(S): 75 TABLET, FILM COATED ORAL at 12:59

## 2023-01-01 RX ADMIN — PANTOPRAZOLE SODIUM 40 MILLIGRAM(S): 20 TABLET, DELAYED RELEASE ORAL at 06:07

## 2023-01-01 RX ADMIN — Medication 4 MILLIGRAM(S): at 22:47

## 2023-01-01 RX ADMIN — AMLODIPINE BESYLATE 2.5 MILLIGRAM(S): 2.5 TABLET ORAL at 21:35

## 2023-01-01 RX ADMIN — ISOSORBIDE MONONITRATE 30 MILLIGRAM(S): 60 TABLET, EXTENDED RELEASE ORAL at 17:59

## 2023-01-01 RX ADMIN — Medication 1 TABLET(S): at 11:49

## 2023-01-01 RX ADMIN — POLYETHYLENE GLYCOL 3350 17 GRAM(S): 17 POWDER, FOR SOLUTION ORAL at 12:40

## 2023-01-01 RX ADMIN — Medication 6: at 11:45

## 2023-01-01 RX ADMIN — Medication 81 MILLIGRAM(S): at 19:13

## 2023-01-01 RX ADMIN — Medication 650 MILLIGRAM(S): at 18:56

## 2023-02-06 NOTE — ED ADULT NURSE NOTE - CAS TRG GEN SKIN COLOR
Normal for race Cephalexin Counseling: I counseled the patient regarding use of cephalexin as an antibiotic for prophylactic and/or therapeutic purposes. Cephalexin (commonly prescribed under brand name Keflex) is a cephalosporin antibiotic which is active against numerous classes of bacteria, including most skin bacteria. Side effects may include nausea, diarrhea, gastrointestinal upset, rash, hives, yeast infections, and in rare cases, hepatitis, kidney disease, seizures, fever, confusion, neurologic symptoms, and others. Patients with severe allergies to penicillin medications are cautioned that there is about a 10% incidence of cross-reactivity with cephalosporins. When possible, patients with penicillin allergies should use alternatives to cephalosporins for antibiotic therapy.

## 2023-05-15 NOTE — ED PROVIDER NOTE - PROGRESS NOTE DETAILS
d/w Card fellow Dr Dumont, admit to cardiac sdu. give plavix and ASA. started heparin drip. give losartan for BP now. may need nitro drip if BP not improve.

## 2023-05-15 NOTE — ED PROVIDER NOTE - NSICDXPASTMEDICALHX_GEN_ALL_CORE_FT
PAST MEDICAL HISTORY:  Afib     BPH (benign prostatic hyperplasia)     Diabetes mellitus     History of throat cancer     HTN (hypertension)

## 2023-05-15 NOTE — ED PROVIDER NOTE - OBJECTIVE STATEMENT
89 years old male history of hypertension, high cholesterol, diabetes, atrial fibrillation on Eliquis, CAD status post stent x1 20 years ago presenting complaint chest pain started around 7 PM while watching TV tonight.  He reports he felt like someone stepped on his chest for almost 30 minutes. pain is tightness like and associated mild shortness of breath.  Otherwise denies diaphoresis associated chest pain.  Denies pain now in ED.  Also report another episode of chest pain 3 days ago.  Otherwise denies recent illness, fever, chills, abdominal pain, nausea, vomiting, diarrhea or urinary symptoms.    Also complaint of constipation for few days.

## 2023-05-15 NOTE — ED PROVIDER NOTE - ABNORMAL RHYTHM
ST and T wave abnormality/1st degree heart block ST and T wave abnormality inferior, anterolater/1st degree heart block

## 2023-05-15 NOTE — ED PROVIDER NOTE - PHYSICAL EXAMINATION
CONSTITUTIONAL: Elderly male; in no apparent distress.   EYES: PERRL; EOM intact.   CARDIOVASCULAR: Normal S1, S2; no murmurs, rubs, or gallops.   RESPIRATORY: Normal chest excursion with respiration; breath sounds clear and equal bilaterally; no wheezes, rhonchi, or rales.  GI/: Normal bowel sounds; non-distended; non-tender; no palpable organomegaly.   MS: No calf swelling and tenderness.  SKIN: Normal for age and race; warm; dry; good turgor; no apparent lesions or exudate.   NEURO/PSYCH: A & O x 4; grossly unremarkable.

## 2023-05-15 NOTE — ED PROVIDER NOTE - ATTENDING APP SHARED VISIT CONTRIBUTION OF CARE
89-year-old male with past medical history of CAD with 1 stent (on Plavix), diabetes, arthritis, paroxysmal A-fib (on Eliquis), HTN, HLD, NSTEMI, BPH, throat cancer s/p resection (in remission) who presents to the ER for midsternal chest discomfort that began this evening while he was watching a "[employer] game on TV.  Patient describes pain to the mid chest area, no radiation to the back or arm, sharp, associated with sweats and some mild shortness of breath.  Denies any nausea/vomiting.  Recent history of constipation but family states he is not really drinking much water.  Chest pain lasted for about 1/2-hour at present in the ED he has no chest pain or shortness of breath.  States he is evaluated by cardiology (Dr. Sinclair his primary cardio now) and family states he had a recent checkup which was okay except for possibly some fluid in his lungs.  No leg pain, no leg swelling, no dizziness, no palpitations, no abdominal pain, no back pain, no headache or neck pain.    Agree with PA exam and management.  We will check labs, EKG, x-ray and reassess    ALL: nkda  SH no smoking and rare ETOH  PMD Orin 89-year-old male with past medical history of CAD with 1 stent (on Plavix), diabetes, arthritis, paroxysmal A-fib (on Eliquis), HTN, HLD, NSTEMI, BPH, throat cancer s/p resection (in remission) who presents to the ER for midsternal chest discomfort that began this evening while he was watching a [employer] game on TV.  Patient describes pain to the mid chest area, no radiation to the back or arm, sharp, but associated with +sweats and some mild +shortness of breath.  Denies any nausea/vomiting.  Recent history of constipation but family states he is not really drinking much water.  Chest pain lasted for about 1/2-hour at present in the ED he has no chest pain or shortness of breath.  States he is evaluated by cardiology (Dr. Sinclair his primary cardio now) and family states he had a recent checkup which was okay except for possibly some fluid in his lungs.  No leg pain, no leg swelling, no dizziness, no palpitations, no abdominal pain, no back pain, no headache or neck pain.    Agree with PA exam and management.  We will check labs, EKG, x-ray and reassess    ALL: nkda  SH no smoking and rare ETOH  PMD Orin

## 2023-05-15 NOTE — ED PROVIDER NOTE - CLINICAL SUMMARY MEDICAL DECISION MAKING FREE TEXT BOX
89-year-old male with past medical history of CAD with 1 stent (on Plavix), diabetes, arthritis, paroxysmal A-fib (on Eliquis), HTN, HLD, NSTEMI, BPH, throat cancer s/p resection (in remission) who presents to the ER for midsternal chest discomfort that began this evening while he was watching a [employer] game on TV.  Patient describes pain to the mid chest area, no radiation to the back or arm, sharp, but associated with +sweats and some mild +shortness of breath.  Denies any nausea/vomiting    Labs, ekg, xray reviewed. EKG SR with ST and T wave abnormality, CXR mild increased vascular markings, and labs +for trop of 0.15. Cardiac fellow consulted, and after discussion recommend pt be admitted to cardiac SDU, to start heparin drip, given Plavix/ASA in ER, and losartan for BP control. TO admit for further management.

## 2023-05-16 NOTE — H&P ADULT - ASSESSMENT
89-year-old male with past medical history of CAD with 1 stent (on Plavix), diabetes, arthritis, paroxysmal A-fib (on Eliquis), HTN, HLD,  BPH, throat cancer s/p resection (in remission) who presents to the ER for midsternal chest pain at rest radiating to left shoulder and upper limb lasting 25-30 minutes.    #Chest pain  #ACS/NSTEMI  - currently free of pain  - loaded with ASA and clopidogrel  - Heparin IVdrip  - c/w Aspirin 81mg qd  - c/w plavix 75mg qd  - c/w heparin IVdrip and adjust according to aPTT  - c/w atotvastatin  - c/w metoprolol target HR 50-70  - TTE to assess LV function  - NPO for possible cardiac cath (not confrimed)  - f/u 2nd set of troponin  - Telemetry monitoring     #Uncontrolled BP  - c/w Losartan 50mg qd  - c/w Nitroglycerin IV drip and titrate according to BP  - c/w metoprolol    #Constipation  - Senna at bedtime  - Miralax qd    #BPH  - on terazosin and finasteride at home  - c/w finasteride   - doxazocin 4mg qd    #DM  - ISS   - check A1c    #DLD  - c/w atorvastatin  - check lipid profile    # 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   89-year-old male with past medical history of CAD with 1 stent (on Plavix), diabetes, arthritis, paroxysmal A-fib (on Eliquis), HTN, HLD,  BPH, throat cancer s/p resection (in remission) who presents to the ER for midsternal chest pain at rest radiating to left shoulder and upper limb lasting 25-30 minutes.    #Chest pain  #ACS/NSTEMI  - currently free of pain  - loaded with ASA and clopidogrel  - Heparin IVdrip  - c/w Aspirin 81mg qd  - c/w plavix 75mg qd  - c/w heparin IVdrip and adjust according to aPTT  - c/w atotvastatin  - c/w metoprolol target HR 50-70  - TTE to assess LV function  - NPO for possible cardiac cath (not confirmed)  - f/u 2nd set of troponin  - Telemetry monitoring     #Uncontrolled BP  - c/w Losartan 50mg qd  - c/w Nitroglycerin IV drip and titrate according to BP  - c/w metoprolol    #Constipation  - Senna at bedtime  - Miralax qd    #BPH  - on terazosin and finasteride at home  - c/w finasteride   - doxazocin 4mg qd    #DM  - ISS   - check A1c    #DLD  - c/w atorvastatin  - check lipid profile    # 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

## 2023-05-16 NOTE — ED ADULT NURSE NOTE - OBJECTIVE STATEMENT
89-year-old mal presented  to the ED c/o chest pain that started a few hours ago while watching TV. Denies SOB.

## 2023-05-16 NOTE — H&P ADULT - NSICDXNOFAMILYHX_GEN_ALL_CORE
[facility]  Speech Language/Pathology  Pediatric Daily Note    Bindu Qiu  2013   10/5/2017      Dx from Physician: [id number] Other speech disturbance  Insurance Type: [employer]  Insurance visits approved: 61      Number of visits:  28 out of 15                                        [facility] in: 11:00  Time out: 11:30  Next Progress Note Due: November 2017     Pt being seen for : [x] Speech Therapy        [x] Language Therapy              [] Voice Therapy  [] Fluency Therapy   [] Swallowing therapy    Subjective:   Behavior:   [] Motivated         [x] Cooperative  [x]  Pleasant                            [] Uncooperative  [] Distractible    [] Self-Limiting   [] Other:    Objective/Assessment:   Patient progressing towards goals:    1. Hugh will produce /f/ in all positions of words with min cues in 90% of opportunities. 100% in all positions   2. Hugh will produce /f/ in all positions of words at the phrase level with min cues in 80% of opportunities. Not addressed  3. Cinthia Alamo will utilize a slower rate when producing phrases and sentences with mod cues in 100% of opportunities. Mod cues to slow down during conversation, age appropriate errors only with slower speech rate  4. Hugh will produce initial and final sounds of words in phrases and sentences with min cues 90% of opportunities. Not addressed   5. Hugh will correctly identify and produce pronouns given pictures with min cues in 100% of opportunities. Following pre-teaching: He: 5/5, She: 4/5, They: 1/10  6. Cinthia Alamo will correctly answer [address] questions given a field of 3 with min cues in 80% of opportunities. What: 6/7 with min cues, Where: 5/7 independently     [x] Pt demonstrated no s/s of pain during this visit. Plan:  [x] Continue as per plan of care  [] Prepare for Discharge  [] Discharge      Patient/Caregiver Education:  [x] Patient/Caregiver Educated on session and progression towards goals.   [] Home exercise program:
<-- Click to add NO pertinent Family History

## 2023-05-16 NOTE — H&P ADULT - NSHPREVIEWOFSYSTEMS_GEN_ALL_CORE
Review of Systems:  CONSTITUTIONAL - No fever, No diaphoresis, No weight change  SKIN - No rash  HEMATOLOGIC - No abnormal bleeding or bruising  EYES - No eye pain, No blurred vision  ENT - HOARSNESS   RESPIRATORY - No shortness of breath, No cough  CARDIAC - as noted in HPI  GI - No abdominal pain, No nausea, No vomiting, No diarrhea, + CONSTIPATION, No bright red blood per rectum or melena. No flank pain  - No dysuria, frequency, hematuria.   ENDO - No polydypsia, No polyuria, No heat/cold intolerance  MUSCULOSKELETAL - No joint paint, No swelling, No back pain  NEUROLOGIC - No numbness, No focal weakness, No headache, No dizziness  All other systems negative, unless specified in HPI

## 2023-05-16 NOTE — ED ADULT NURSE NOTE - NSFALLHARMRISKINTERV_ED_ALL_ED

## 2023-05-16 NOTE — H&P ADULT - NSHPLABSRESULTS_GEN_ALL_CORE
LABS:  cret                        11.7   6.01  )-----------( 116      ( 15 May 2023 21:10 )             34.3     05-15    138  |  100  |  24<H>  ----------------------------<  181<H>  4.4   |  26  |  1.2    Ca    9.1      15 May 2023 21:10    TPro  6.6  /  Alb  4.1  /  TBili  0.5  /  DBili  x   /  AST  19  /  ALT  17  /  AlkPhos  67  05-15    PT/INR - ( 15 May 2023 23:09 )   PT: 18.10 sec;   INR: 1.57 ratio         PTT - ( 15 May 2023 23:09 )  PTT:39.5 sec    Pro-Brain Natriuretic Peptide: 306 pg/mL (05.15.23 @ 21:10)     Troponin T, Serum: 0.15     Lipase, Serum: 22 U/L (05.15.23 @ 21:10)

## 2023-05-16 NOTE — ED ADULT NURSE REASSESSMENT NOTE - NS ED NURSE REASSESS COMMENT FT1
Heparin drip paused at this time as aPTT 133.3. Dr. Richard was notified, however MD states he is not covering this patient and as per MD FER being reassigned. Awaiting further orders at this time. Pt denies chest pain or discomfort. IV patent. No signs of distress noted. Tele monitoring continued.

## 2023-05-16 NOTE — H&P ADULT - ATTENDING COMMENTS
Patient seen, case d/w cardiology fellow.  Agree with assessment and plan.  89-yo male who presented with progressive angina, NSTEMI.  Asymptomatic since medical therapy started.  ECHO pending.  LHC versus conservative approach (given patient's age and limited functional status). Will d/w patient and family after ECHO.  Admit to SDU.

## 2023-05-16 NOTE — H&P ADULT - HISTORY OF PRESENT ILLNESS
89-year-old male with past medical history of CAD with 1 stent (on Plavix), diabetes, arthritis, paroxysmal A-fib (on Eliquis), HTN, HLD,  BPH, throat cancer s/p resection (in remission) who presents to the ER for midsternal chest pain at rest radiating to left shoulder and upper limb lasting 25-30 minutes. The pain had resolved by the time he arrived in the ED. He denies shortness of breath, sweating, nausea, vomiting, lower extremity swelling, palpitations, orthopnea..   He reports that he dad similar pain last week with exertion (went up a flight of stairs) that resolved with rest.    In ED, patient was found to be hypertensive with /100 and blood tests showed elevated troponin of 0.15.  ECG showed sinus rhythm with 1st degree AV block and t-wave inversions in inferior leads (present on old ECG form 1/5/21) and V3-V6 (new compared to old ECG).    Patient was diagnosed with NSTEMI and uncontrolled hypertension.   He was loaded with ASA and Plavix and started on heparin IVdrip. BP was controlled with IV nitroglycerin.  Patient is admitted to cardiology SDU for further management.

## 2023-05-16 NOTE — H&P ADULT - NSHPPHYSICALEXAM_GEN_ALL_CORE
T(C): 36.8 (05-15-23 @ 20:33), Max: 36.8 (05-15-23 @ 20:33)  HR: 71 (05-16-23 @ 00:57) (62 - 77)  BP: 162/88 (05-16-23 @ 00:57) (162/88 - 192/89)  RR: 18 (05-16-23 @ 00:57) (18 - 18)  SpO2: 98% (05-16-23 @ 00:57) (98% - 99%)    CONSTITUTIONAL: Well groomed, no apparent distress  EYES: PERRLA and symmetric, EOMI, No conjunctival or scleral injection, non-icteric  ENMT: Oral mucosa with moist membranes. Normal dentition; no pharyngeal injection or exudates             NECK: Supple, symmetric and without tracheal deviation   RESP: No respiratory distress, no use of accessory muscles; CTA b/l, no WRR  CV: RRR, +S1S2, no MRG; no JVD; no peripheral edema  GI: Soft, NT, ND, no rebound, no guarding; no palpable masses; no hepatosplenomegaly; no hernia palpated  MSK: Normal gait; No digital clubbing or cyanosis; examination of the (head/neck/spine/ribs/pelvis, RUE, LUE, RLE, LLE) without misalignment,            Normal ROM without pain, no spinal tenderness, normal muscle strength/tone  SKIN: No rashes or ulcers noted; no subcutaneous nodules or induration palpable  NEURO: CN II-XII intact; normal reflexes in upper and lower extremities, sensation intact in upper and lower extremities b/l to light touch   PSYCH: Appropriate insight/judgment; A+O x 3, mood and affect appropriate, recent/remote memory intact

## 2023-05-17 NOTE — PATIENT PROFILE ADULT - FALL HARM RISK - HARM RISK INTERVENTIONS

## 2023-05-17 NOTE — PROGRESS NOTE ADULT - SUBJECTIVE AND OBJECTIVE BOX
CHIEF COMPLAINT:  Patient is a 89y old  Male who presents with a chief complaint of Chest pain (16 May 2023 01:39)      INTERVAL HISTORY/OVERNIGHT EVENTS:  Episode of chest pain overnight s/p Morphine. Bradycardic this am HR 45/min sinus veronica.    ======================  MEDICATIONS:  aspirin  chewable 81 milliGRAM(s) Oral daily  atorvastatin 40 milliGRAM(s) Oral at bedtime  cholecalciferol 1000 Unit(s) Oral daily  clopidogrel Tablet 75 milliGRAM(s) Oral daily  dextrose 50% Injectable 25 Gram(s) IV Push once  dextrose 50% Injectable 25 Gram(s) IV Push once  dextrose 50% Injectable 12.5 Gram(s) IV Push once  doxazosin 4 milliGRAM(s) Oral at bedtime  finasteride 5 milliGRAM(s) Oral daily  folic acid 1 milliGRAM(s) Oral daily  furosemide    Tablet 20 milliGRAM(s) Oral daily  glucagon  Injectable 1 milliGRAM(s) IntraMuscular once  insulin lispro (ADMELOG) corrective regimen sliding scale   SubCutaneous three times a day before meals  losartan 50 milliGRAM(s) Oral daily  melatonin 5 milliGRAM(s) Oral at bedtime  multivitamin 1 Tablet(s) Oral daily  pantoprazole    Tablet 40 milliGRAM(s) Oral before breakfast  PARoxetine 20 milliGRAM(s) Oral daily  polyethylene glycol 3350 17 Gram(s) Oral daily  senna 2 Tablet(s) Oral at bedtime    DRIPS:  dextrose 5%. 1000 milliLiter(s) (100 mL/Hr) IV Continuous <Continuous>  dextrose 5%. 1000 milliLiter(s) (50 mL/Hr) IV Continuous <Continuous>  heparin  Infusion. 550 Unit(s)/Hr (5.5 mL/Hr) IV Continuous <Continuous>      ======================  PHYSICAL EXAMINATION:  GEN:  nad.   HEENT:  eomi. ncat  PULM:  b/l lung sounds   CARD: s1, s2  ABD: +bs. ntnd  EXT:  no new rashes.    NEURO:  no new focal deficits.   ======================  OBJECTIVE:        VS:  T(F): 97.8 (05-17 @ 17:03), Max: 97.8 (05-17 @ 17:03)  HR: 62 (05-17 @ 17:03) (48 - 70)  BP: 129/57 (05-17 @ 17:03) (107/55 - 136/65)  RR: 18 (05-17 @ 17:03) (18 - 18)  SpO2: 100% (05-17 @ 17:03) (95% - 100%)      I/O:      05-17 @ 07:01  -  05-17 @ 18:35  --------------------------------------------------------  IN: 0 mL / OUT: 300 mL / NET: -300 mL        Weight trend:  Weight (kg): 80.7 (05-16)    ======================    LABS:                          9.5    4.89  )-----------( 119      ( 17 May 2023 16:53 )             28.5     05-17    141  |  105  |  28<H>  ----------------------------<  170<H>  4.4   |  20  |  1.2    Ca    9.0      17 May 2023 04:45  Mg     2.2     05-17    TPro  6.2  /  Alb  3.5  /  TBili  0.7  /  DBili  x   /  AST  21  /  ALT  15  /  AlkPhos  55  05-16    LIVER FUNCTIONS - ( 16 May 2023 09:06 )  Alb: 3.5 g/dL / Pro: 6.2 g/dL / ALK PHOS: 55 U/L / ALT: 15 U/L / AST: 21 U/L / GGT: x           PT/INR - ( 17 May 2023 04:45 )   PT: 15.70 sec;   INR: 1.36 ratio         PTT - ( 17 May 2023 04:45 )  PTT:159.3 sec    CARDIAC MARKERS ( 16 May 2023 17:02 )  x     / 0.19 ng/mL / x     / x     / x      CARDIAC MARKERS ( 16 May 2023 09:06 )  x     / 0.26 ng/mL / x     / x     / x      CARDIAC MARKERS ( 15 May 2023 21:10 )  x     / 0.15 ng/mL / x     / x     / x

## 2023-05-17 NOTE — PATIENT PROFILE ADULT - INTERNATIONAL TRAVEL
[facility]  [address]  [facility] [address]  Phone:  [phone]  Fax:  [phone]                                    Bernabe Avalos   MRN: [id number]    Department:  [facility]   Date of Visit:  3/24/2020           After Visit Summary Signature Page    I have received my discharge instructions, and my questions have been answered. I have discussed any challenges I see with this plan with the nurse or doctor.    ..........................................................................................................................................  Patient/Patient Representative Signature      ..........................................................................................................................................  Patient Representative Print Name and Relationship to Patient    ..................................................               ................................................  Date                                   Time    ..........................................................................................................................................  Reviewed by Signature/Title    ...................................................              ..............................................  Date                                               Time          22EPIC Rev 08/18       
No

## 2023-05-17 NOTE — PROGRESS NOTE ADULT - ASSESSMENT
89-year-old male with past medical history of CAD with 1 stent (on Plavix), diabetes, arthritis, paroxysmal A-fib (on Eliquis), HTN, HLD,  BPH, throat cancer s/p resection (in remission) who presents to the ER for midsternal chest pain at rest radiating to left shoulder and upper limb lasting 25-30 minutes.    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 seen incidentally on CT-AP on 2021  - patient and family aware  - They do not want to pursue further work up(MRI abdomen) given the patient's age    Diet DASH, carbohydrate restricted  Activity bed rest   GI PPX pantoprazole  DVT ppx Lovenox  Dispo SDU

## 2023-05-17 NOTE — PATIENT PROFILE ADULT - FALL HARM RISK - FACTORS NURSING JUDGEMENT
Yes
Has The Cancer Been Biopsied Before?: has been previously biopsied
Who Is Your Referring Provider?: Dr. Caroline Ramsay
When Was Your Biopsy?: 01/07/2020
Body Location Override (Optional): nasal dorsum

## 2023-05-18 NOTE — PROGRESS NOTE ADULT - SUBJECTIVE AND OBJECTIVE BOX
CHIEF COMPLAINT:  Patient is a 89y old  Male who presents with a chief complaint of Chest pain (16 May 2023 01:39)      INTERVAL HISTORY/OVERNIGHT EVENTS:  Episode of chest pain overnight s/p nitro SL     ======================  MEDICATIONS:  aspirin  chewable 81 milliGRAM(s) Oral daily  atorvastatin 40 milliGRAM(s) Oral at bedtime  cholecalciferol 1000 Unit(s) Oral daily  clopidogrel Tablet 75 milliGRAM(s) Oral daily  dextrose 50% Injectable 25 Gram(s) IV Push once  dextrose 50% Injectable 25 Gram(s) IV Push once  dextrose 50% Injectable 12.5 Gram(s) IV Push once  doxazosin 4 milliGRAM(s) Oral at bedtime  finasteride 5 milliGRAM(s) Oral daily  folic acid 1 milliGRAM(s) Oral daily  furosemide    Tablet 20 milliGRAM(s) Oral daily  glucagon  Injectable 1 milliGRAM(s) IntraMuscular once  insulin lispro (ADMELOG) corrective regimen sliding scale   SubCutaneous three times a day before meals  losartan 50 milliGRAM(s) Oral daily  melatonin 5 milliGRAM(s) Oral at bedtime  multivitamin 1 Tablet(s) Oral daily  pantoprazole    Tablet 40 milliGRAM(s) Oral before breakfast  PARoxetine 20 milliGRAM(s) Oral daily  polyethylene glycol 3350 17 Gram(s) Oral daily  senna 2 Tablet(s) Oral at bedtime    DRIPS:  dextrose 5%. 1000 milliLiter(s) (100 mL/Hr) IV Continuous <Continuous>  dextrose 5%. 1000 milliLiter(s) (50 mL/Hr) IV Continuous <Continuous>  heparin  Infusion. 550 Unit(s)/Hr (5.5 mL/Hr) IV Continuous <Continuous>      ======================  PHYSICAL EXAMINATION:  CONSTITUTIONAL: Well groomed, no apparent distress  EYES: PERRLA and symmetric, EOMI, No conjunctival or scleral injection, non-icteric  ENMT: Oral mucosa with moist membranes. Normal dentition; no pharyngeal injection or exudates  NECK: Supple, symmetric and without tracheal deviation   RESP: No respiratory distress, no use of accessory muscles; CTA b/l, no WRR  CV: RRR, +S1S2, no MRG; no JVD; no peripheral edema  GI: Soft, NT, ND, no rebound, no guarding; no palpable masses; no hepatosplenomegaly; no hernia palpated  MSK: Normal gait; No digital clubbing or cyanosis; examination of the (head/neck/spine/ribs/pelvis, RUE, LUE, RLE, LLE) without misalignment,   Normal ROM without pain, no spinal tenderness, normal muscle strength/tone  SKIN: No rashes or ulcers noted; no subcutaneous nodules or induration palpable  NEURO: CN II-XII intact; normal reflexes in upper and lower extremities, sensation intact in upper and lower extremities b/l to light touch   PSYCH: Appropriate insight/judgment; A+O x 3, mood and affect appropriate, recent/remote memory intact  ======================  OBJECTIVE:  VS:  T(F): 97.8 (05-17 @ 17:03), Max: 97.8 (05-17 @ 17:03)  HR: 62 (05-17 @ 17:03) (48 - 70)  BP: 129/57 (05-17 @ 17:03) (107/55 - 136/65)  RR: 18 (05-17 @ 17:03) (18 - 18)  SpO2: 100% (05-17 @ 17:03) (95% - 100%)      I/O:      05-17 @ 07:01  -  05-17 @ 18:35  --------------------------------------------------------  IN: 0 mL / OUT: 300 mL / NET: -300 mL        Weight trend:  Weight (kg): 80.7 (05-16)    ======================    LABS:               9.7    6.59  )-----------( 108      ( 18 May 2023 06:21 )             29.0     05-18    145  |  109  |  26<H>  ----------------------------<  152<H>  4.2   |  25  |  1.1    Ca    9.1      18 May 2023 06:21  Mg     2.1     05-18      PT/INR - ( 17 May 2023 04:45 )   PT: 15.70 sec;   INR: 1.36 ratio         PTT - ( 17 May 2023 16:53 )  PTT:91.4 sec

## 2023-05-18 NOTE — PROGRESS NOTE ADULT - ASSESSMENT
89-year-old male with past medical history of CAD with 1 stent (on Plavix), diabetes, arthritis, paroxysmal A-fib (on Eliquis), HTN, HLD,  BPH, throat cancer s/p resection (in remission) who presents to the ER for midsternal chest pain at rest radiating to left shoulder and upper limb lasting 25-30 minutes.    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 seen incidentally on CT-AP on 2021  - patient and family aware  - They do not want to pursue further work up(MRI abdomen) given the patient's age    Diet DASH, carbohydrate restricted  Activity IAT, PT to see    GI PPX pantoprazole  DVT ppx Lovenox  Dispo SDU

## 2023-05-18 NOTE — CHART NOTE - NSCHARTNOTEFT_GEN_A_CORE
Code blue called on patient, He had syncopal event and was found to be initially pulseless by nursing staff. CPR initiated with return of pulse after a few seconds. He was placed on supplemental oxygen. On Exam he is A/Ox4 able to move all extremities, lungs CTAB, No chest pain, No SOB. Beta blocker has been discontinued and EP consult placed.     Vitals: /70 ( likely reactive), Pulse 65, RR 18, O2 100 on NRB     DX: Sinus arrest Junctional bradycardia

## 2023-05-18 NOTE — CONSULT NOTE ADULT - ASSESSMENT
Cards: Yahir    89-year-old male with past medical history of CAD with 1 stent (on Plavix), diabetes, arthritis, paroxysmal A-fib (on Eliquis), HTN, HLD,  BPH, throat cancer s/p resection (in remission) who presents to the ER for midsternal chest pain at rest radiating to left shoulder and upper limb lasting 25-30 minutes. Admitted with uncontrolled HTN and NSTEMI, treated medically. 5/18 pt syncopized,  CODE blue for sinus bradycardia 20s. CPR initied, no medications given.     Impression:  Sinus bradycardia 20s, now AF rate controlled  NSTEMI  Hx CAD s/p PCI  Hx DM II  Hx PAF  Hx HTN, HLD    W/u: EF 55%    TSH 2.9     Plan:  - Pt will require DC PPM, discussed with patient and family. All risks and benefits explained.  - Will be added on with Dr. Giron tomorrow  - NPO after midnight  - Eliquis currently on hold  - Hold any DVT ppx  - Metoprolol currently on hold- may resume for CAD once PPM is placed if no contraindication  - Will follow

## 2023-05-18 NOTE — PHYSICAL THERAPY INITIAL EVALUATION ADULT - ADDITIONAL COMMENTS
Pt resides with son in a private house using no steps to enter from garage, 6+6 indoor steps to access bedroom/shower. Pt is independent with overall functional mobility, able to ambulate using RW at baseline 2/2 R hip OA.

## 2023-05-18 NOTE — PHYSICAL THERAPY INITIAL EVALUATION ADULT - GENERAL OBSERVATIONS, REHAB EVAL
14:15-15:00. chart reviewed. Pt received semi-joseph at B/S, [place], alert, oriented X 3, able to follow one step instructions son at B/S and agreeable to PT evaluation.  + monitoring, + IV, + o2 2 L via NC, vitals 101/51 HR 93, pt is asymptomatic, NAD. requires CGA for overall functional mobility, able to ambulate for 40ft using RW, return back to chair, unresponsive, code blue called, /97 HR 30s. Pt left semi-reclining at B/S /79 HR 73, NRB SPo2 100%, Nurse Rehana was present

## 2023-05-18 NOTE — CONSULT NOTE ADULT - SUBJECTIVE AND OBJECTIVE BOX
Patient is a 89y old  Male who presents with a chief complaint of Chest pain (18 May 2023 13:28)        HPI:  89-year-old male with past medical history of CAD with 1 stent (on Plavix), diabetes, arthritis, paroxysmal A-fib (on Eliquis), HTN, HLD,  BPH, throat cancer s/p resection (in remission) who presents to the ER for midsternal chest pain at rest radiating to left shoulder and upper limb lasting 25-30 minutes. The pain had resolved by the time he arrived in the ED. He denies shortness of breath, sweating, nausea, vomiting, lower extremity swelling, palpitations, orthopnea..   He reports that he dad similar pain last week with exertion (went up a flight of stairs) that resolved with rest.    In ED, patient was found to be hypertensive with /100 and blood tests showed elevated troponin of 0.15.  ECG showed sinus rhythm with 1st degree AV block and t-wave inversions in inferior leads (present on old ECG form 1/5/21) and V3-V6 (new compared to old ECG).    Patient was diagnosed with NSTEMI and uncontrolled hypertension.   He was loaded with ASA and Plavix and started on heparin IVdrip. BP was controlled with IV nitroglycerin.  Patient is admitted to cardiology SDU for further management. (16 May 2023 01:39)      EP consulted for     REVIEW OF SYSTEMS    [ ] A ten-point review of systems was otherwise negative except as noted.  [ ] Due to altered mental status/intubation, subjective information were not able to be obtained from the patient. History was obtained, to the extent possible, from review of the chart and collateral sources of information.      PAST MEDICAL & SURGICAL HISTORY:  Afib      HTN (hypertension)      BPH (benign prostatic hyperplasia)      Diabetes mellitus      History of throat cancer      History of throat cancer      S/P appendectomy          Home Medications:  apixaban 5 mg oral tablet: 1 orally 2 times a day (16 May 2023 01:59)  aspirin 81 mg oral tablet: orally once a day (16 May 2023 01:59)  clopidogrel 75 mg oral tablet: 1 orally once a day (16 May 2023 01:59)  famotidine 20 mg oral tablet: 1 orally once a day (16 May 2023 01:59)  finasteride 5 mg oral tablet: 1 orally once a day (16 May 2023 01:59)  folic acid 1 mg oral tablet: 1 orally once a day (16 May 2023 01:59)  furosemide 20 mg oral tablet: 1 tab(s) orally once a day (16 May 2023 01:59)  glipiZIDE 5 mg oral tablet: 2.5 milligram(s) orally 2 times a day (16 May 2023 01:59)  melatonin 5 mg oral tablet: 1 orally once a day (at bedtime) (16 May 2023 02:00)  Multiple Vitamins oral tablet: 1 orally once a day (16 May 2023 01:59)  PARoxetine 20 mg oral tablet: 1 orally once a day (16 May 2023 01:59)  simvastatin 80 mg oral tablet: 1 orally once a day (at bedtime) (16 May 2023 01:59)  terazosin 5 mg oral tablet: orally once a day (16 May 2023 01:59)  Vitamin D3 25 mcg (1000 intl units) oral tablet: 1 orally once a day (16 May 2023 02:00)      Allergies:    Allergy Status Unknown      FAMILY HISTORY:  No pertinent family history in first degree relatives        SOCIAL HISTORY:  CIGARETTES:  ALCOHOL:    MEDICATIONS  (STANDING):  amLODIPine   Tablet 2.5 milliGRAM(s) Oral at bedtime  aspirin  chewable 81 milliGRAM(s) Oral daily  atorvastatin 40 milliGRAM(s) Oral at bedtime  chlorhexidine 2% Cloths 1 Application(s) Topical <User Schedule>  cholecalciferol 1000 Unit(s) Oral daily  clopidogrel Tablet 75 milliGRAM(s) Oral daily  dextrose 5%. 1000 milliLiter(s) (100 mL/Hr) IV Continuous <Continuous>  dextrose 5%. 1000 milliLiter(s) (50 mL/Hr) IV Continuous <Continuous>  dextrose 50% Injectable 25 Gram(s) IV Push once  dextrose 50% Injectable 12.5 Gram(s) IV Push once  dextrose 50% Injectable 25 Gram(s) IV Push once  doxazosin 4 milliGRAM(s) Oral at bedtime  enoxaparin Injectable 40 milliGRAM(s) SubCutaneous every 24 hours  finasteride 5 milliGRAM(s) Oral daily  folic acid 1 milliGRAM(s) Oral daily  furosemide    Tablet 20 milliGRAM(s) Oral daily  glucagon  Injectable 1 milliGRAM(s) IntraMuscular once  insulin lispro (ADMELOG) corrective regimen sliding scale   SubCutaneous three times a day before meals  isosorbide   mononitrate ER Tablet (IMDUR) 30 milliGRAM(s) Oral daily  melatonin 5 milliGRAM(s) Oral at bedtime  multivitamin 1 Tablet(s) Oral daily  pantoprazole    Tablet 40 milliGRAM(s) Oral before breakfast  PARoxetine 20 milliGRAM(s) Oral daily  polyethylene glycol 3350 17 Gram(s) Oral daily  senna 2 Tablet(s) Oral at bedtime    MEDICATIONS  (PRN):  dextrose Oral Gel 15 Gram(s) Oral once PRN Blood Glucose LESS THAN 70 milliGRAM(s)/deciliter  nitroglycerin     SubLingual 0.4 milliGRAM(s) SubLingual every 10 minutes PRN Chest Pain      Vital Signs Last 24 Hrs  T(C): 36.3 (18 May 2023 07:57), Max: 36.6 (17 May 2023 17:03)  T(F): 97.3 (18 May 2023 07:57), Max: 97.8 (17 May 2023 17:03)  HR: 81 (18 May 2023 12:18) (55 - 86)  BP: 111/53 (18 May 2023 12:18) (111/53 - 206/86)  BP(mean): 77 (18 May 2023 12:18) (77 - 124)  RR: 20 (18 May 2023 12:18) (16 - 40)  SpO2: 98% (18 May 2023 12:18) (94% - 100%)    Parameters below as of 17 May 2023 20:06  Patient On (Oxygen Delivery Method): nasal cannula  O2 Flow (L/min): 2      PHYSICAL EXAM:    GENERAL: In no apparent distress, well nourished, and hydrated.  HEART: Regular rate and rhythm; No murmurs, rubs, or gallops.  PULMONARY: Clear to auscultation and perfusion.  No rales, wheezing, or rhonchi bilaterally.  ABDOMEN: Soft, Nontender, Nondistended; Bowel sounds present  EXTREMITIES:  2+ Peripheral Pulses, No clubbing, cyanosis, or edema  NEUROLOGICAL: AO x4, GALLO, speech clear    INTERPRETATION OF TELEMETRY:    ECG:    I&O's Detail    17 May 2023 07:01  -  18 May 2023 07:00  --------------------------------------------------------  IN:    Nitroglycerin: 3 mL  Total IN: 3 mL    OUT:    Voided (mL): 500 mL  Total OUT: 500 mL    Total NET: -497 mL      18 May 2023 07:01  -  18 May 2023 16:16  --------------------------------------------------------  IN:    Oral Fluid: 207 mL  Total IN: 207 mL    OUT:    Voided (mL): 400 mL  Total OUT: 400 mL    Total NET: -193 mL          LABS:                        9.7    6.59  )-----------( 108      ( 18 May 2023 06:21 )             29.0     05-18    145  |  109  |  26<H>  ----------------------------<  152<H>  4.2   |  25  |  1.1    Ca    9.1      18 May 2023 06:21  Mg     2.1     05-18      CARDIAC MARKERS ( 18 May 2023 00:45 )  x     / 0.09 ng/mL / x     / x     / 3.2 ng/mL  CARDIAC MARKERS ( 16 May 2023 17:02 )  x     / 0.19 ng/mL / x     / x     / x          PT/INR - ( 17 May 2023 04:45 )   PT: 15.70 sec;   INR: 1.36 ratio         PTT - ( 17 May 2023 16:53 )  PTT:91.4 sec  BNP      I&O's Detail    17 May 2023 07:01  -  18 May 2023 07:00  --------------------------------------------------------  IN:    Nitroglycerin: 3 mL  Total IN: 3 mL    OUT:    Voided (mL): 500 mL  Total OUT: 500 mL    Total NET: -497 mL      18 May 2023 07:01  -  18 May 2023 16:16  --------------------------------------------------------  IN:    Oral Fluid: 207 mL  Total IN: 207 mL    OUT:    Voided (mL): 400 mL  Total OUT: 400 mL    Total NET: -193 mL          RADIOLOGY: Patient is a 89y old  Male who presents with a chief complaint of Chest pain (18 May 2023 13:28)      HPI:  89-year-old male with past medical history of CAD with 1 stent (on Plavix), diabetes, arthritis, paroxysmal A-fib (on Eliquis), HTN, HLD,  BPH, throat cancer s/p resection (in remission) who presents to the ER for midsternal chest pain at rest radiating to left shoulder and upper limb lasting 25-30 minutes. The pain had resolved by the time he arrived in the ED. He denies shortness of breath, sweating, nausea, vomiting, lower extremity swelling, palpitations, orthopnea..   He reports that he dad similar pain last week with exertion (went up a flight of stairs) that resolved with rest.    In ED, patient was found to be hypertensive with /100 and blood tests showed elevated troponin of 0.15.  ECG showed sinus rhythm with 1st degree AV block and t-wave inversions in inferior leads (present on old ECG form 1/5/21) and V3-V6 (new compared to old ECG).    Patient was diagnosed with NSTEMI and uncontrolled hypertension.   He was loaded with ASA and Plavix and started on heparin IVdrip. BP was controlled with IV nitroglycerin.  Patient is admitted to cardiology SDU for further management. (16 May 2023 01:39)      EP consulted for evaluation for PPM. Today, pt was walking with PT when he became faint, staff sat him in the chair and he lost consciousness. When placed back on tele he was sinus bradycardia 25 bpm, CPR was initiated for about 30 sec when ROSC was achieved. No medications given. Pt was taking metoprolol that was discontinued on 5/16 PM for bradycardia.     REVIEW OF SYSTEMS    [x] A ten-point review of systems was otherwise negative except as noted.  [ ] Due to altered mental status/intubation, subjective information were not able to be obtained from the patient. History was obtained, to the extent possible, from review of the chart and collateral sources of information.      PAST MEDICAL & SURGICAL HISTORY:  Afib      HTN (hypertension)      BPH (benign prostatic hyperplasia)      Diabetes mellitus      History of throat cancer      History of throat cancer      S/P appendectomy          Home Medications:  apixaban 5 mg oral tablet: 1 orally 2 times a day (16 May 2023 01:59)  aspirin 81 mg oral tablet: orally once a day (16 May 2023 01:59)  clopidogrel 75 mg oral tablet: 1 orally once a day (16 May 2023 01:59)  famotidine 20 mg oral tablet: 1 orally once a day (16 May 2023 01:59)  finasteride 5 mg oral tablet: 1 orally once a day (16 May 2023 01:59)  folic acid 1 mg oral tablet: 1 orally once a day (16 May 2023 01:59)  furosemide 20 mg oral tablet: 1 tab(s) orally once a day (16 May 2023 01:59)  glipiZIDE 5 mg oral tablet: 2.5 milligram(s) orally 2 times a day (16 May 2023 01:59)  melatonin 5 mg oral tablet: 1 orally once a day (at bedtime) (16 May 2023 02:00)  Multiple Vitamins oral tablet: 1 orally once a day (16 May 2023 01:59)  PARoxetine 20 mg oral tablet: 1 orally once a day (16 May 2023 01:59)  simvastatin 80 mg oral tablet: 1 orally once a day (at bedtime) (16 May 2023 01:59)  terazosin 5 mg oral tablet: orally once a day (16 May 2023 01:59)  Vitamin D3 25 mcg (1000 intl units) oral tablet: 1 orally once a day (16 May 2023 02:00)      Allergies:    Allergy Status Unknown      FAMILY HISTORY:  No pertinent family history in first degree relatives        SOCIAL HISTORY:  CIGARETTES: denies  ALCOHOL: denies    MEDICATIONS  (STANDING):  amLODIPine   Tablet 2.5 milliGRAM(s) Oral at bedtime  aspirin  chewable 81 milliGRAM(s) Oral daily  atorvastatin 40 milliGRAM(s) Oral at bedtime  chlorhexidine 2% Cloths 1 Application(s) Topical <User Schedule>  cholecalciferol 1000 Unit(s) Oral daily  clopidogrel Tablet 75 milliGRAM(s) Oral daily  doxazosin 4 milliGRAM(s) Oral at bedtime  enoxaparin Injectable 40 milliGRAM(s) SubCutaneous every 24 hours  finasteride 5 milliGRAM(s) Oral daily  folic acid 1 milliGRAM(s) Oral daily  furosemide    Tablet 20 milliGRAM(s) Oral daily  glucagon  Injectable 1 milliGRAM(s) IntraMuscular once  insulin lispro (ADMELOG) corrective regimen sliding scale   SubCutaneous three times a day before meals  isosorbide   mononitrate ER Tablet (IMDUR) 30 milliGRAM(s) Oral daily  melatonin 5 milliGRAM(s) Oral at bedtime  multivitamin 1 Tablet(s) Oral daily  pantoprazole    Tablet 40 milliGRAM(s) Oral before breakfast  PARoxetine 20 milliGRAM(s) Oral daily  polyethylene glycol 3350 17 Gram(s) Oral daily  senna 2 Tablet(s) Oral at bedtime    MEDICATIONS  (PRN):  dextrose Oral Gel 15 Gram(s) Oral once PRN Blood Glucose LESS THAN 70 milliGRAM(s)/deciliter  nitroglycerin     SubLingual 0.4 milliGRAM(s) SubLingual every 10 minutes PRN Chest Pain      Vital Signs Last 24 Hrs  T(C): 36.3 (18 May 2023 07:57), Max: 36.6 (17 May 2023 17:03)  T(F): 97.3 (18 May 2023 07:57), Max: 97.8 (17 May 2023 17:03)  HR: 81 (18 May 2023 12:18) (55 - 86)  BP: 111/53 (18 May 2023 12:18) (111/53 - 206/86)  BP(mean): 77 (18 May 2023 12:18) (77 - 124)  RR: 20 (18 May 2023 12:18) (16 - 40)  SpO2: 98% (18 May 2023 12:18) (94% - 100%)    Parameters below as of 17 May 2023 20:06  Patient On (Oxygen Delivery Method): nasal cannula  O2 Flow (L/min): 2      PHYSICAL EXAM:    GENERAL: Elderly male, [place]. In no apparent distress, well nourished, and hydrated.  HEART: Regular rate and rhythm; No murmurs, rubs, or gallops.  PULMONARY: Clear to auscultation and perfusion.  No rales, wheezing, or rhonchi bilaterally.  ABDOMEN: Soft, Nontender, Nondistended; Bowel sounds present  EXTREMITIES:  2+ Peripheral Pulses, No clubbing, cyanosis, or edema  NEUROLOGICAL: AO x4, GALLO, speech clear    INTERPRETATION OF TELEMETRY: AF, rate controlled 70s    ECG:  < from: 12 Lead ECG (05.18.23 @ 14:53) >  Ventricular Rate 65 BPM    Atrial Rate 101 BPM    QRS Duration 84 ms    Q-T Interval 394 ms    QTC Calculation(Bazett) 409 ms    R Axis -14 degrees    T Axis -49 degrees    Diagnosis Line Atrial fibrillation  Voltage criteria for left ventricular hypertrophy  Inferior infarct , age undetermined  ST & T wave abnormality, consider lateral ischemia  Abnormal ECG    < end of copied text >      I&O's Detail    17 May 2023 07:01  -  18 May 2023 07:00  --------------------------------------------------------  IN:    Nitroglycerin: 3 mL  Total IN: 3 mL    OUT:    Voided (mL): 500 mL  Total OUT: 500 mL    Total NET: -497 mL      18 May 2023 07:01  -  18 May 2023 16:16  --------------------------------------------------------  IN:    Oral Fluid: 207 mL  Total IN: 207 mL    OUT:    Voided (mL): 400 mL  Total OUT: 400 mL    Total NET: -193 mL          LABS:                        9.7    6.59  )-----------( 108      ( 18 May 2023 06:21 )             29.0     05-18    145  |  109  |  26<H>  ----------------------------<  152<H>  4.2   |  25  |  1.1    Ca    9.1      18 May 2023 06:21  Mg     2.1     05-18      CARDIAC MARKERS ( 18 May 2023 00:45 )  x     / 0.09 ng/mL / x     / x     / 3.2 ng/mL  CARDIAC MARKERS ( 16 May 2023 17:02 )  x     / 0.19 ng/mL / x     / x     / x          PT/INR - ( 17 May 2023 04:45 )   PT: 15.70 sec;   INR: 1.36 ratio         PTT - ( 17 May 2023 16:53 )  PTT:91.4 sec  BNP      I&O's Detail    17 May 2023 07:01  -  18 May 2023 07:00  --------------------------------------------------------  IN:    Nitroglycerin: 3 mL  Total IN: 3 mL    OUT:    Voided (mL): 500 mL  Total OUT: 500 mL    Total NET: -497 mL      18 May 2023 07:01  -  18 May 2023 16:16  --------------------------------------------------------  IN:    Oral Fluid: 207 mL  Total IN: 207 mL    OUT:    Voided (mL): 400 mL  Total OUT: 400 mL    Total NET: -193 mL      RADIOLOGY:  < from: TTE Echo Complete w/o Contrast w/ Doppler (05.16.23 @ 11:51) >  Summary:   1. LV Ejection Fraction by Bello's Method with a biplane EF of 55 %.   2. Normal global left ventricular systolic function.   3. Mild to moderately enlarged left atrium.   4. LA volume Index is 41.2 ml/m² ml/m2.   5. Normal right atrial size.    PHYSICIAN INTERPRETATION:  Left Ventricle: Global LV systolic function was normal.  Right Ventricle: Normal right ventricular size and function.  Left Atrium: Mild to moderately enlarged left atrium. LA volume Index is   41.2 ml/m² ml/m2.  Right Atrium: Normal right atrial size. RA Area is 12.93 cm² cm2.  Pericardium: There is no evidence of pericardial effusion.  Mitral Valve: No evidence of mitral valve stenosis. No evidence of mitral   valve regurgitation is seen.  Tricuspid Valve: The tricuspid valve is normal in structure. Trivial   tricuspid regurgitation is visualized.  Aortic Valve: The aortic valve is trileaflet. No evidence of aortic   stenosis. Aortic valve thickening with normal leaflet opening. Trivial   aortic valve regurgitation is seen.  Pulmonic Valve: The pulmonic valve is normal. Trace pulmonic valve   regurgitation.  Venous: The inferior vena cava was normal sized, with respiratory size   variation greater than 50%.    < end of copied text >

## 2023-05-18 NOTE — PHYSICAL THERAPY INITIAL EVALUATION ADULT - GAIT TRAINING, PT EVAL
Pt will ambulate using RW or least restrictive AD for 75 ft with supervision by discharge to facilitate return to PLOF. Pt will negotiate 6 steps using 1 HR with CGA

## 2023-05-18 NOTE — PHYSICAL THERAPY INITIAL EVALUATION ADULT - PERTINENT HX OF CURRENT PROBLEM, REHAB EVAL
89-year-old male with past medical history of CAD with 1 stent (on Plavix), diabetes, arthritis, paroxysmal A-fib (on Eliquis), HTN, HLD,  BPH, throat cancer s/p resection (in remission) who presents to the ER for midsternal chest pain at rest radiating to left shoulder and upper limb lasting 25-30 minutes.

## 2023-05-19 NOTE — CHART NOTE - NSCHARTNOTEFT_GEN_A_CORE
PREOPERATIVE DAY OF PROCEDURE EVALUATION:  I have personally seen and examined the patient.  I agree with the history and physical which I have reviewed and noted any changes below.  (Signed electronically by __________)  05-19-23 @ 08:17      89-year-old male with past medical history of CAD with 1 stent (patient states approximately 25 years ago but unsure which hospital), diabetes, arthritis, paroxysmal A-fib (on Eliquis), HTN, HLD,  BPH, throat cancer s/p resection (in remission) who presents to the ER for midsternal chest pain at rest radiating to left shoulder and upper limb lasting 25-30 minutes. Admitted with uncontrolled HTN and NSTEMI, treated medically. On 5/18 pt synopsized,  CODE blue for sinus bradycardia with HR in 20s--CPR was initiated and patient regained ROSC, no medications given during code.  Patient presents today for [facility] with possible intervention.       < from: TTE Echo Complete w/o Contrast w/ Doppler (05.16.23 @ 11:51) >    Summary:   1. LV Ejection Fraction by Bello's Method with a biplane EF of 55 %.   2. Normal global left ventricular systolic function.   3. Mild to moderately enlarged left atrium.   4. LA volume Index is 41.2 ml/m² ml/m2.   5. Normal right atrial size.        Cath Bleeding Risk: 3.8%    Prehydration:  ml bolus x 1 hr prior to cardiac cath    Right luca test: positive    Asa81 mg and Clopidogrel 75mg can be given early for procedure

## 2023-05-19 NOTE — PROGRESS NOTE ADULT - SUBJECTIVE AND OBJECTIVE BOX
CHIEF COMPLAINT:  Patient is a 89y old  Male who presents with a chief complaint of Chest pain (16 May 2023 01:39)      INTERVAL HISTORY/OVERNIGHT EVENTS:  5/18 pt syncopized,  CODE blue for sinus bradycardia 20s. CPR initied, no medications given.     ======================  MEDICATIONS:  MEDICATIONS  (STANDING):  amLODIPine   Tablet 2.5 milliGRAM(s) Oral at bedtime  aspirin  chewable 81 milliGRAM(s) Oral daily  atorvastatin 40 milliGRAM(s) Oral at bedtime  chlorhexidine 2% Cloths 1 Application(s) Topical <User Schedule>  cholecalciferol 1000 Unit(s) Oral daily  clopidogrel Tablet 75 milliGRAM(s) Oral daily  dextrose 5%. 1000 milliLiter(s) (100 mL/Hr) IV Continuous <Continuous>  dextrose 5%. 1000 milliLiter(s) (50 mL/Hr) IV Continuous <Continuous>  dextrose 50% Injectable 25 Gram(s) IV Push once  dextrose 50% Injectable 25 Gram(s) IV Push once  dextrose 50% Injectable 12.5 Gram(s) IV Push once  doxazosin 4 milliGRAM(s) Oral at bedtime  finasteride 5 milliGRAM(s) Oral daily  folic acid 1 milliGRAM(s) Oral daily  furosemide    Tablet 20 milliGRAM(s) Oral daily  glucagon  Injectable 1 milliGRAM(s) IntraMuscular once  insulin lispro (ADMELOG) corrective regimen sliding scale   SubCutaneous three times a day before meals  isosorbide   mononitrate ER Tablet (IMDUR) 30 milliGRAM(s) Oral daily  melatonin 5 milliGRAM(s) Oral at bedtime  multivitamin 1 Tablet(s) Oral daily  pantoprazole    Tablet 40 milliGRAM(s) Oral before breakfast  PARoxetine 20 milliGRAM(s) Oral daily  polyethylene glycol 3350 17 Gram(s) Oral daily  senna 2 Tablet(s) Oral at bedtime    MEDICATIONS  (PRN):  dextrose Oral Gel 15 Gram(s) Oral once PRN Blood Glucose LESS THAN 70 milliGRAM(s)/deciliter  nitroglycerin     SubLingual 0.4 milliGRAM(s) SubLingual every 10 minutes PRN Chest Pain    ======================  PHYSICAL EXAMINATION:  CONSTITUTIONAL: Well groomed, no apparent distress  EYES: PERRLA and symmetric, EOMI, No conjunctival or scleral injection, non-icteric  ENMT: Oral mucosa with moist membranes. Normal dentition; no pharyngeal injection or exudates  NECK: Supple, symmetric and without tracheal deviation   RESP: No respiratory distress, no use of accessory muscles; CTA b/l, no WRR  CV: RRR, +S1S2, no MRG; no JVD; no peripheral edema  GI: Soft, NT, ND, no rebound, no guarding; no palpable masses; no hepatosplenomegaly; no hernia palpated  MSK: Normal gait; No digital clubbing or cyanosis; examination of the (head/neck/spine/ribs/pelvis, RUE, LUE, RLE, LLE) without misalignment,   Normal ROM without pain, no spinal tenderness, normal muscle strength/tone  SKIN: No rashes or ulcers noted; no subcutaneous nodules or induration palpable  NEURO: CN II-XII intact; normal reflexes in upper and lower extremities, sensation intact in upper and lower extremities b/l to light touch   PSYCH: Appropriate insight/judgment; A+O x 3, mood and affect appropriate, recent/remote memory intact  ======================  OBJECTIVE:  Vital Signs Last 24 Hrs  T(C): 36.8 (19 May 2023 07:42), Max: 36.8 (19 May 2023 07:42)  T(F): 98.2 (19 May 2023 07:42), Max: 98.2 (19 May 2023 07:42)  HR: 85 (19 May 2023 07:42) (69 - 85)  BP: 131/62 (19 May 2023 07:42) (113/60 - 228/102)  BP(mean): 89 (19 May 2023 07:42) (80 - 147)  RR: 18 (19 May 2023 08:05) (18 - 28)  SpO2: 96% (19 May 2023 07:42) (55% - 100%)    Parameters below as of 18 May 2023 20:00  Patient On (Oxygen Delivery Method): room air    I/O:      05-17 @ 07:01  -  05-17 @ 18:35  --------------------------------------------------------  IN: 0 mL / OUT: 300 mL / NET: -300 mL        Weight trend:  Weight (kg): 80.7 (05-16)    ======================    LABS:                     8.8    5.20  )-----------( 104      ( 19 May 2023 05:13 )             26.7     05-19    145  |  109  |  30<H>  ----------------------------<  153<H>  4.1   |  24  |  1.1    Ca    8.7      19 May 2023 05:13  Mg     2.0     05-19    TPro  5.7<L>  /  Alb  3.5  /  TBili  0.5  /  DBili  x   /  AST  24  /  ALT  12  /  AlkPhos  53  05-19    PTT - ( 17 May 2023 16:53 )  PTT:91.4 sec

## 2023-05-19 NOTE — PROGRESS NOTE ADULT - ASSESSMENT
89-year-old male with past medical history of CAD with 1 stent (on Plavix), diabetes, arthritis, paroxysmal A-fib (on Eliquis), HTN, HLD,  BPH, throat cancer s/p resection (in remission) who presents with NSTEMI and sinus bradycardia. Stable initially on medical therapy but had an episode of syncope with severe bradycardia and dyspnea after walking a few steps with PT     #Chest pain  #NSTEMI  #Sinus bradycardia, now AF rate control  - TTE reviewed showed normal EF  - ECG c/w severe RCA stenosis   - loaded with ASA and clopidogrel  - s/p Heparin ggt for 48 hours  - c/w Aspirin 81mg qd  - c/w plavix 75mg qd  - c/w atorvastatin  - Post cath- Triple vessel disease, no intervention as stenosis is distal- medical therapy   - Pt will require DC PPM to be placed today 5/19  - avoid AV nettie blocking agents until after PPM     #Uncontrolled BP  - d/c Losartan 50mg qd  - start amlodipine 2.5 qd   - start imdur 30 OD     #Constipation  - Senna at bedtime  - Miralax qd    #BPH  - on terazosin and finasteride at home  - c/w finasteride   - doxazocin 4mg qd    #DM  - ISS     #DLD  - c/w atorvastatin    # 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 Stable initially on medical therapy but had an episode of syncope with severe bradycardia and dyspnea after walking a few steps with PT.    #Chest pain  #NSTEMI  #Sinus bradycardia, now AF rate control  - TTE reviewed showed normal EF  - ECG c/w severe RCA stenosis   - loaded with ASA and clopidogrel  - s/p Heparin ggt for 48 hours  - c/w Aspirin 81mg qd  - c/w plavix 75mg qd  - c/w atorvastatin  - Post cath- Triple vessel disease, no intervention as stenosis is distal- medical therapy   - Pt will require DC PPM to be placed today 5/19  - avoid AV nettie blocking agents until after PPM     #Uncontrolled BP  - d/c Losartan 50mg qd  - start amlodipine 2.5 qd   - start imdur 30 OD     #Constipation  - Senna at bedtime  - Miralax qd    #BPH  - on terazosin and finasteride at home  - c/w finasteride   - doxazocin 4mg qd    #DM  - ISS     #DLD  - c/w atorvastatin    # 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 seen incidentally on CT-AP on 2021  - patient and family aware  - They do not want to pursue further work up(MRI abdomen) given the patient's age    Diet DASH, carbohydrate restricted  Activity IAT   GI PPX pantoprazole  DVT ppx Lovenox

## 2023-05-19 NOTE — BRIEF OPERATIVE NOTE - NSICDXBRIEFPREOP_GEN_ALL_CORE_FT
PRE-OP DIAGNOSIS:  Syncope 19-May-2023 14:40:35  Gasper Giron  Symptomatic bradycardia 19-May-2023 14:40:58  Gasper Giron

## 2023-05-19 NOTE — CHART NOTE - NSCHARTNOTEFT_GEN_A_CORE
PRE-OP DIAGNOSIS: suspected CAD    PROCEDURE: [facility] with coronary angiography    Physician: Dr Rebollar      ANESTHESIA TYPE:  [  ]General Anesthesia  [  ] Sedation  [x] Local/Regional    ESTIMATED BLOOD LOSS:    10   mL    CONDITION  [  ] Critical  [  ] Serious  [  ]Fair  [ x]Good      SPECIMENS REMOVED (IF APPLICABLE): N/A      IV CONTRAST:      40    mL      IMPLANTS (IF APPLICABLE)      FINDINGS    Left Heart Catheterization:  LVEF%: 55% from echo  LVEDP: 17 mmHg        LEFT HEART CATHETERIZATION                                    Left main Distal moderate disease    LAD: ostial 50%, prox moderate disease,  Mid moderate to severe disease, Distal moderate disease, Apical 100% occlusion , collaterals from other vessels            Dia% lesion in prox segment , mild to moderate disease in mid and distal segments.     Left Circumflex: prox short moderate disease , distal small  OM: 80% multiple lesions SUNDAY 3    Right Coronary Artery: Prox mild disease, mild disease, distal patent stent with moderate disease  RPDA multiple significant lesions, moderate to severe disease  RPL mild to moderate disease     DOMINANCE: Right    ACCESS: Right radial , subclavian very tortuous               Right femoral artery: 5 Fr sheath   CLOSURE: right radial , TR band                  right femoral artery sutured in place. manual compression     INTERVENTION  IMPLANTS: none           POST-OP DIAGNOSIS  Triple vessel disease.         PLAN OF CARE    [x ] Return to In-patient bed  medical treatment   EP follow up  IV hydration 50 cc/hr for 6 hrs.     Results of procedure/ plan of care discussed with patient/  in detail.

## 2023-05-19 NOTE — PROGRESS NOTE ADULT - SUBJECTIVE AND OBJECTIVE BOX
CTS Attending  ----------------  pt referred by Dr. Sheffield for DDD PPM implantation  pt had syncope and bradycardia, leading to a cardiac arrest  pt and family interviewed together after cardiac cath  procedure explained in detail, including rirsks, benefits and alternatives, and pt agreed to proceed    -FMR

## 2023-05-19 NOTE — BRIEF OPERATIVE NOTE - NSICDXBRIEFPOSTOP_GEN_ALL_CORE_FT
POST-OP DIAGNOSIS:  Syncope 19-May-2023 14:40:43  Gasper Giron  Symptomatic bradycardia 19-May-2023 14:41:14  Gasper Giron

## 2023-05-20 NOTE — PROGRESS NOTE ADULT - ASSESSMENT
89-year-old male with past medical history of CAD with 1 stent (on Plavix), diabetes, arthritis, paroxysmal A-fib (on Eliquis), HTN, HLD,  BPH, throat cancer s/p resection (in remission) who presents with NSTEMI and sinus bradycardia. Stable initially on medical therapy but had an episode of syncope with severe bradycardia and dyspnea after walking a few steps with PT.    #Chest pain  #NSTEMI  #Sinus bradycardia, now AF rate control  - TTE reviewed showed normal EF  - ECG c/w severe RCA stenosis   - Rpt ECG 5/20 ischemic changes unchanged from prior   - Post cath- Severe Apical LAD and RCA , no intervention as stenosis is distal and patient is of advanced age - medical therapy   - loaded with ASA and clopidogrel  - s/p Heparin ggt for 48 hours  - c/w Aspirin 81mg qd  - c/w plavix 75mg qd  - c/w atorvastatin  - s/p ppm placement 5/19  - start metoprolol 50 BID     #Orthostatic hypotension   - syncopal episode while ambulating   - Check orthostatics  - SADIQ stocking if orthostatics positive     #HTN   - d/c Losartan 50mg qd  - start amlodipine 2.5 qd   - start imdur 30 OD     #Constipation  - Senna at bedtime  - Miralax qd    #BPH  - on terazosin and finasteride at home  - c/w finasteride   - doxazocin 4mg qd    #DM  - ISS     #DLD  - c/w atorvastatin    # 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 seen incidentally on CT-AP on 2021  - patient and family aware  - They do not want to pursue further work up(MRI abdomen) given the patient's age    Diet DASH, carbohydrate restricted  Activity IAT   GI PPX pantoprazole  DVT ppx Lovenox

## 2023-05-20 NOTE — PROGRESS NOTE ADULT - ASSESSMENT
Cards: Yahir    89-year-old male with past medical history of CAD with 1 stent (on Plavix), diabetes, arthritis, paroxysmal A-fib (on Eliquis), HTN, HLD,  BPH, throat cancer s/p resection (in remission) who presents to the ER for midsternal chest pain at rest radiating to left shoulder and upper limb lasting 25-30 minutes. Admitted with uncontrolled HTN and NSTEMI, treated medically. 5/18 pt syncopized,  CODE blue for sinus bradycardia 20s. CPR initied, no medications given.     Impression:  S/P DC PPM Implant ([facility])  Sinus bradycardia 20s, now AF rate controlled  NSTEMI  Hx CAD s/p PCI  Hx DM II  Hx PAF  Hx HTN, HLD    Plan:  - CXR completed  - Interrogation completed, numbers within appropriate range  - No anticoagulation 48 hours postop  - Start Keflex 500mg BID x 5 days  - Continue all other home medications  - Do not lift L arm above shoulder height or lift > 5 lbs for 6 weeks  - Wound care as per CT surgery  - Follow up with Dr Sheffield in one month

## 2023-05-20 NOTE — PROGRESS NOTE ADULT - SUBJECTIVE AND OBJECTIVE BOX
INTERVAL HPI/OVERNIGHT EVENTS:  S/P DC PPM Implant   POD#1 and pt feeling well    MEDICATIONS  (STANDING):  amLODIPine   Tablet 2.5 milliGRAM(s) Oral at bedtime  atorvastatin 40 milliGRAM(s) Oral at bedtime  chlorhexidine 2% Cloths 1 Application(s) Topical <User Schedule>  cholecalciferol 1000 Unit(s) Oral daily  doxazosin 4 milliGRAM(s) Oral at bedtime  finasteride 5 milliGRAM(s) Oral daily  folic acid 1 milliGRAM(s) Oral daily  furosemide    Tablet 20 milliGRAM(s) Oral daily  isosorbide   mononitrate ER Tablet (IMDUR) 30 milliGRAM(s) Oral daily  melatonin 5 milliGRAM(s) Oral at bedtime  multivitamin 1 Tablet(s) Oral daily  pantoprazole    Tablet 40 milliGRAM(s) Oral before breakfast  PARoxetine 20 milliGRAM(s) Oral daily  polyethylene glycol 3350 17 Gram(s) Oral daily  senna 2 Tablet(s) Oral at bedtime  vancomycin  IVPB 1000 milliGRAM(s) IV Intermittent every 12 hours    MEDICATIONS  (PRN):  acetaminophen     Tablet .. 650 milliGRAM(s) Oral every 6 hours PRN Temp greater or equal to 38C (100.4F), Mild Pain (1 - 3), Moderate Pain (4 - 6)  HYDROmorphone  Injectable 0.5 milliGRAM(s) IV Push every 10 minutes PRN Moderate Pain (4 - 6)  nitroglycerin     SubLingual 0.4 milliGRAM(s) SubLingual every 10 minutes PRN Chest Pain      Allergies    Allergy Status Unknown    Intolerances        REVIEW OF SYSTEMS: No CP, palpitations, dizziness or SOB    Vital Signs Last 24 Hrs  T(C): 36.3 (20 May 2023 07:57), Max: 36.8 (19 May 2023 13:25)  T(F): 97.4 (20 May 2023 07:57), Max: 98.3 (19 May 2023 14:55)  HR: 92 (20 May 2023 07:57) (71 - 92)  BP: 140/67 (20 May 2023 07:57) (123/57 - 170/76)  BP(mean): 96 (20 May 2023 07:57) (82 - 98)  RR: 17 (20 May 2023 07:57) (15 - 21)  SpO2: 95% (20 May 2023 07:57) (93% - 98%)    Parameters below as of 20 May 2023 07:57  Patient On (Oxygen Delivery Method): room air        05-19-23 @ 07:01  -  05-20-23 @ 07:00  --------------------------------------------------------  IN: 330 mL / OUT: 400 mL / NET: -70 mL    05-20-23 @ 07:01  -  05-20-23 @ 10:05  --------------------------------------------------------  IN: 250 mL / OUT: 0 mL / NET: 250 mL        Physical Exam  GENERAL: In no apparent distress, well nourished, and hydrated.  EYES: EOMI, PERRLA, conjunctiva and sclera clear  NECK: Supple  HEART: Regular rate and rhythm; No murmurs, rubs, or gallops.  PULMONARY: Clear to auscultation and perfusion.  No rales, wheezing, or rhonchi bilaterally.  EXTREMITIES:  2+ Peripheral Pulses, No clubbing, cyanosis, or edema  SKIN: Dressing over L chest wall, no hematoma  NEUROLOGICAL: Grossly nonfocal    LABS:                        8.6    5.99  )-----------( 109      ( 20 May 2023 05:36 )             26.0     05-20    137  |  102  |  22<H>  ----------------------------<  140<H>  3.9   |  24  |  0.9    Ca    8.7      20 May 2023 05:36  Mg     1.9     05-20    TPro  5.8<L>  /  Alb  3.6  /  TBili  0.7  /  DBili  x   /  AST  51<H>  /  ALT  33  /  AlkPhos  53  05-20          RADIOLOGY & ADDITIONAL TESTS:

## 2023-05-21 NOTE — PROGRESS NOTE ADULT - ASSESSMENT
89-year-old male with past medical history of CAD with 1 stent (on Plavix), diabetes, arthritis, paroxysmal A-fib (on Eliquis), HTN, HLD,  BPH, throat cancer s/p resection (in remission) who presents with NSTEMI and sinus bradycardia. Stable initially on medical therapy but had an episode of syncope with severe bradycardia and dyspnea after walking a few steps with PT.    #Chest pain  #NSTEMI  #Junctional bradycardia, high degree AV block s/p PPM  - TTE reviewed showed normal EF  - ECG c/w inferior lateral ischemic changes    - Post cath- Severe Apical LAD and RCA not amenable to pCI  - c/w ASA and clopidogrel  - resume heparin drip when cleared by EP and d/c aspirin  - c/w atorvastatin  - s/p ppm placement 5/19  - c/w metoprolol 50 BID   - c/w IMDUR 60mg qd, Amlodipine 2.5 qd  - consider Ranexa    #Orthostatic hypotension   - syncopal episode while ambulating   - Check orthostatics  - SADIQ stocking if orthostatics positive     #Constipation  - Senna at bedtime  - Miralax qd    #BPH  - on terazosin and finasteride at home  - c/w finasteride   - doxazocin 4mg qd    #DM  - ISS     #DLD  - c/w atorvastatin    # 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 seen incidentally on CT-AP on 2021  - patient and family aware  - They do not want to pursue further work up(MRI abdomen) given the patient's age    Diet DASH, carbohydrate restricted  Activity IAT   GI PPX pantoprazole  DVT ppx Lovenox 89-year-old male with past medical history of CAD with 1 stent (on Plavix), diabetes, arthritis, paroxysmal A-fib (on Eliquis), HTN, HLD,  BPH, throat cancer s/p resection (in remission) who presents with NSTEMI and sinus bradycardia. Stable initially on medical therapy but had an episode of syncope with severe bradycardia and dyspnea after walking a few steps with PT.    #Chest pain  #NSTEMI  #Junctional bradycardia, high degree AV block s/p PPM  - TTE reviewed showed normal EF  - ECG c/w inferior lateral ischemic changes    - Post cath- Severe Apical LAD and RCA not amenable to pCI  - c/w ASA and clopidogrel  - resume heparin drip when cleared by EP and d/c aspirin  - c/w atorvastatin  - s/p ppm placement 5/19  - c/w metoprolol 50 BID   - c/w IMDUR 60mg qd, d/c Amlodipine 2.5 qd given orthostatic hypotension  - consider Ranexa    #Orthostatic hypotension   - syncopal episode while ambulating   - d/c Amlodipine and Cardura  - SADIQ stocking if orthostatics positive     #Constipation  - Senna at bedtime  - Miralax qd    #BPH  - on terazosin and finasteride at home  - c/w finasteride   - switch doxazocin 4mg qd to flomax    #DM  - ISS     #DLD  - c/w atorvastatin    # 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 seen incidentally on CT-AP on 2021  - patient and family aware  - They do not want to pursue further work up(MRI abdomen) given the patient's age    Diet DASH, carbohydrate restricted  Activity IAT   GI PPX pantoprazole  DVT ppx Lovenox

## 2023-05-21 NOTE — PROGRESS NOTE ADULT - TIME BILLING
CTS Attending  ----------------  pt referred by Dr. Sheffield for DDD PPM implantation  pt had syncope and bradycardia, leading to a cardiac arrest  pt and family interviewed together after cardiac cath  procedure explained in detail, including rirsks, benefits and alternatives, and pt agreed to proceed    -FMR
NSTEMI.
NSTEMI, bradycardia, near syncope.
NSTEMI, bradycardia, syncope.
NSTEMI, bradycardia, orthostatic hypotension.
high degree AV block, pacemaker implant

## 2023-05-21 NOTE — PROGRESS NOTE ADULT - SUBJECTIVE AND OBJECTIVE BOX
CHIEF COMPLAINT:  Patient is a 89y old  Male who presents with a chief complaint of Chest pain (20 May 2023 17:53)      INTERVAL HISTORY/OVERNIGHT EVENTS:  Episode of pre syncope with chest pain yesterday while working with PT, no events on tele.     ======================  MEDICATIONS:  amLODIPine   Tablet 2.5 milliGRAM(s) Oral at bedtime  aspirin  chewable 81 milliGRAM(s) Oral daily  atorvastatin 40 milliGRAM(s) Oral at bedtime  chlorhexidine 2% Cloths 1 Application(s) Topical <User Schedule>  cholecalciferol 1000 Unit(s) Oral daily  clopidogrel Tablet 75 milliGRAM(s) Oral daily  dextrose 50% Injectable 25 Gram(s) IV Push once  dextrose 50% Injectable 25 Gram(s) IV Push once  dextrose 50% Injectable 12.5 Gram(s) IV Push once  doxazosin 4 milliGRAM(s) Oral at bedtime  finasteride 5 milliGRAM(s) Oral daily  folic acid 1 milliGRAM(s) Oral daily  furosemide    Tablet 20 milliGRAM(s) Oral daily  glucagon  Injectable 1 milliGRAM(s) IntraMuscular once  insulin lispro (ADMELOG) corrective regimen sliding scale   SubCutaneous three times a day before meals  isosorbide   mononitrate ER Tablet (IMDUR) 60 milliGRAM(s) Oral daily  melatonin 5 milliGRAM(s) Oral at bedtime  metoprolol tartrate 50 milliGRAM(s) Oral two times a day  multivitamin 1 Tablet(s) Oral daily  pantoprazole    Tablet 40 milliGRAM(s) Oral before breakfast  PARoxetine 20 milliGRAM(s) Oral daily  polyethylene glycol 3350 17 Gram(s) Oral daily  senna 2 Tablet(s) Oral at bedtime    DRIPS:  dextrose 5%. 1000 milliLiter(s) (50 mL/Hr) IV Continuous <Continuous>  dextrose 5%. 1000 milliLiter(s) (100 mL/Hr) IV Continuous <Continuous>      ======================  PHYSICAL EXAMINATION:  GEN:  nad.   HEENT:  eomi. ncat  PULM:  b/l lung sounds   CARD: s1, s2  ABD: +bs. ntnd  EXT:  no new rashes.    NEURO:  no new focal deficits.   ======================  OBJECTIVE:        VS:  T(F): 98.2 (05-20 @ 16:00), Max: 98.2 (05-20 @ 16:00)  HR: 60 (05-21 @ 05:30) (60 - 97)  BP: 120/56 (05-21 @ 05:30) (113/56 - 136/68)  RR: 20 (05-21 @ 05:30) (18 - 21)  SpO2: 96% (05-21 @ 05:30) (95% - 98%)      I/O:      05-18 @ 07:01  -  05-19 @ 07:00  --------------------------------------------------------  IN: 257 mL / OUT: 400 mL / NET: -143 mL    05-19 @ 07:01  -  05-20 @ 07:00  --------------------------------------------------------  IN: 330 mL / OUT: 400 mL / NET: -70 mL    05-20 @ 07:01  -  05-21 @ 07:00  --------------------------------------------------------  IN: 1208 mL / OUT: 1050 mL / NET: 158 mL        Weight trend:  Weight (kg): 82.7 (05-19)    ======================    LABS:                          8.6    5.99  )-----------( 109      ( 20 May 2023 05:36 )             26.0     05-20    137  |  102  |  22<H>  ----------------------------<  140<H>  3.9   |  24  |  0.9    Ca    8.7      20 May 2023 05:36  Mg     1.9     05-20    TPro  5.8<L>  /  Alb  3.6  /  TBili  0.7  /  DBili  x   /  AST  51<H>  /  ALT  33  /  AlkPhos  53  05-20    LIVER FUNCTIONS - ( 20 May 2023 05:36 )  Alb: 3.6 g/dL / Pro: 5.8 g/dL / ALK PHOS: 53 U/L / ALT: 33 U/L / AST: 51 U/L / GGT: x             CKMB Units: 6.8 ng/mL (05-20 @ 17:33)  Troponin T, Serum: 0.18 ng/mL (05-20 @ 17:33)    CARDIAC MARKERS ( 20 May 2023 17:33 )  x     / 0.18 ng/mL / x     / x     / 6.8 ng/mL           CHIEF COMPLAINT:  Patient is a 89y old  Male who presents with a chief complaint of chest pain (20 May 2023 17:53).      INTERVAL HISTORY/OVERNIGHT EVENTS:  Episode of pre syncope with chest pain yesterday while working with PT, no events on tele.     ======================  MEDICATIONS:  amLODIPine   Tablet 2.5 milliGRAM(s) Oral at bedtime  aspirin  chewable 81 milliGRAM(s) Oral daily  atorvastatin 40 milliGRAM(s) Oral at bedtime  chlorhexidine 2% Cloths 1 Application(s) Topical <User Schedule>  cholecalciferol 1000 Unit(s) Oral daily  clopidogrel Tablet 75 milliGRAM(s) Oral daily  dextrose 50% Injectable 25 Gram(s) IV Push once  dextrose 50% Injectable 25 Gram(s) IV Push once  dextrose 50% Injectable 12.5 Gram(s) IV Push once  doxazosin 4 milliGRAM(s) Oral at bedtime  finasteride 5 milliGRAM(s) Oral daily  folic acid 1 milliGRAM(s) Oral daily  furosemide    Tablet 20 milliGRAM(s) Oral daily  glucagon  Injectable 1 milliGRAM(s) IntraMuscular once  insulin lispro (ADMELOG) corrective regimen sliding scale   SubCutaneous three times a day before meals  isosorbide   mononitrate ER Tablet (IMDUR) 60 milliGRAM(s) Oral daily  melatonin 5 milliGRAM(s) Oral at bedtime  metoprolol tartrate 50 milliGRAM(s) Oral two times a day  multivitamin 1 Tablet(s) Oral daily  pantoprazole    Tablet 40 milliGRAM(s) Oral before breakfast  PARoxetine 20 milliGRAM(s) Oral daily  polyethylene glycol 3350 17 Gram(s) Oral daily  senna 2 Tablet(s) Oral at bedtime    DRIPS:  dextrose 5%. 1000 milliLiter(s) (50 mL/Hr) IV Continuous <Continuous>  dextrose 5%. 1000 milliLiter(s) (100 mL/Hr) IV Continuous <Continuous>      ======================  PHYSICAL EXAMINATION:  GEN:  nad.   HEENT:  eomi. ncat  PULM:  b/l lung sounds   CARD: s1, s2  ABD: +bs. ntnd  EXT:  no new rashes.    NEURO:  no new focal deficits.   ======================  OBJECTIVE:        VS:  T(F): 98.2 (05-20 @ 16:00), Max: 98.2 (05-20 @ 16:00)  HR: 60 (05-21 @ 05:30) (60 - 97)  BP: 120/56 (05-21 @ 05:30) (113/56 - 136/68)  RR: 20 (05-21 @ 05:30) (18 - 21)  SpO2: 96% (05-21 @ 05:30) (95% - 98%)      I/O:      05-18 @ 07:01  -  05-19 @ 07:00  --------------------------------------------------------  IN: 257 mL / OUT: 400 mL / NET: -143 mL    05-19 @ 07:01  -  05-20 @ 07:00  --------------------------------------------------------  IN: 330 mL / OUT: 400 mL / NET: -70 mL    05-20 @ 07:01  -  05-21 @ 07:00  --------------------------------------------------------  IN: 1208 mL / OUT: 1050 mL / NET: 158 mL        Weight trend:  Weight (kg): 82.7 (05-19)    ======================    LABS:                          8.6    5.99  )-----------( 109      ( 20 May 2023 05:36 )             26.0     05-20    137  |  102  |  22<H>  ----------------------------<  140<H>  3.9   |  24  |  0.9    Ca    8.7      20 May 2023 05:36  Mg     1.9     05-20    TPro  5.8<L>  /  Alb  3.6  /  TBili  0.7  /  DBili  x   /  AST  51<H>  /  ALT  33  /  AlkPhos  53  05-20    LIVER FUNCTIONS - ( 20 May 2023 05:36 )  Alb: 3.6 g/dL / Pro: 5.8 g/dL / ALK PHOS: 53 U/L / ALT: 33 U/L / AST: 51 U/L / GGT: x             CKMB Units: 6.8 ng/mL (05-20 @ 17:33)  Troponin T, Serum: 0.18 ng/mL (05-20 @ 17:33)    CARDIAC MARKERS ( 20 May 2023 17:33 )  x     / 0.18 ng/mL / x     / x     / 6.8 ng/mL

## 2023-05-21 NOTE — PROGRESS NOTE ADULT - ATTENDING COMMENTS
Patient seen, case d/w cardiology fellow.  89-yo male who presented with NSTEMI.  Stable since admission on medical therapy, normal LVEF.  Medical therapy versus LHC discussed with patient's family. The agreement is to continue medical therapy, consider LHC if becomes symptomatic again.  Will get OOB tomorrow and try to ambulate.  Continue cardiac monitoring.
patient seen, case d/w stepdown team on rounds.  NSTEMI, diffuse apical LAD and RPDA/RPLV disease not amenable to intervention. Episode of CP today, Metoprolol restarted, will increase Imdur.  S/p PPM but another near syncopal episode today, likely orthostatic.  Will try again tomorrow.  May need rehab prior to discharge.
Patient seen, case d/w EP and interventional attendings.  LHC done today, severe apical LAD and distal RCA disease. No intervention performed.  S/p PPM today.  Patient is stable after the procedures.  Will restart BB tomorrow.  Continue cardiac monitoring.  OOB to chair.  PT.
Patient seen, case d/w stepdown team, interventional and EP attendings.  89-yo male who presented with NSTEMI and sinus bradycardia.  Stable initially on medical therapy but had an episode of syncope with severe bradycardia and dyspnea after walking a few steps today.  ECG is c/w severe RCA stenosis.  Continue treatment.  [facility] d/w patient and family. Will schedule tomorrow.  PPM prior to discharge, possibly tomorrow.  Continue cardiac monitoring.
Patient seen, case d/w stepdown team on rounds.  Agree with assessment and plan.  NSTEMI due to severe RPDA and RPLV disease not amenable to PCI.  Severe bradycardia, s/p PPM.  Patient continues to experience episodes of CP and orthostatic hypotension.  Will hold Amlodipine and replace Doxazosin with Flomax.  Continue Metoprolol, will increase Imdur to 60 mg daily.  OOB to chair, ambulate with assistance.   Continue cardiac monitoring.

## 2023-05-22 PROBLEM — N40.0 BENIGN PROSTATIC HYPERPLASIA WITHOUT LOWER URINARY TRACT SYMPTOMS: Chronic | Status: ACTIVE | Noted: 2023-01-01

## 2023-05-22 PROBLEM — E11.9 TYPE 2 DIABETES MELLITUS WITHOUT COMPLICATIONS: Chronic | Status: ACTIVE | Noted: 2023-01-01

## 2023-05-22 PROBLEM — Z85.819 PERSONAL HISTORY OF MALIGNANT NEOPLASM OF UNSPECIFIED SITE OF LIP, ORAL CAVITY, AND PHARYNX: Chronic | Status: ACTIVE | Noted: 2023-01-01

## 2023-05-22 NOTE — DISCHARGE NOTE PROVIDER - NSDCFUSCHEDAPPT_GEN_ALL_CORE_FT
Gege Antonio  [facility]  [facility] [address] [facility]  Scheduled Appointment: 06/21/2023

## 2023-05-22 NOTE — DISCHARGE NOTE PROVIDER - HOSPITAL COURSE
Patient is a 89 year old male with PMHx of CAD S/P PCI in the past, DM, PAF on Eliquis for thromboembolic prophylaxis, HTN, HLD , Throat CA in the past s/p resection who presented to the ED on 5/16/23 with complaints of chest pain on exertion. In ED troponin was noted to be 0.15 in there setting of Hypertensive urgency with SBP >190 and new anterolateral ischemic changes on EKG. Patient was admitted for NSTEMI and started on IV heparin as well as IV nitro. He was loaded ton ASA/Plavix and scheduled for cardiac catheterization.     TTE was performed on 5/16/23 and showed LVEF 55%.    Patient was taken to the cath lab on 5/19/23. LHC showed a 100% apical LAD lesion which willed via collaterals, 80% OM with SUNDAY 3 flow and moderate-severe RPDA with Patent distal RCA stent. Decision was made to treat the patient medically.    Hospital course was complicated by syncopal episode with no pulse. CPR was initiated. Review of tele showed Sinus arrest and EP was consulted. Decision was made to implant permanent pacemaker. On 5/19/23 patient underwent [employer] Kartik DC PPM implantation. Keflex was started for 5 post procedure.    Of note during work up patient was noted to have pancreatic mass Vs duodenal diverticula on CT of the abdomen and pelvis. Results were relayed to family however decision was made not to pursue further work up due to advanced age.    BP medications were started and titrated to goal as patient was weaned off nitroglycerin drip. Patient however continued to have orthostasis so BP medications needed to be further adjusted. There will likely need to be some degree of hypertension to prevent orthostasis.

## 2023-05-22 NOTE — DISCHARGE NOTE PROVIDER - NSDCMRMEDTOKEN_GEN_ALL_CORE_FT
apixaban 5 mg oral tablet: 1 orally 2 times a day  aspirin 81 mg oral tablet: orally once a day  clopidogrel 75 mg oral tablet: 1 orally once a day  famotidine 20 mg oral tablet: 1 orally once a day  finasteride 5 mg oral tablet: 1 orally once a day  folic acid 1 mg oral tablet: 1 orally once a day  furosemide 20 mg oral tablet: 1 tab(s) orally once a day  glipiZIDE 5 mg oral tablet: 2.5 milligram(s) orally 2 times a day  melatonin 5 mg oral tablet: 1 orally once a day (at bedtime)  Multiple Vitamins oral tablet: 1 orally once a day  PARoxetine 20 mg oral tablet: 1 orally once a day  simvastatin 80 mg oral tablet: 1 orally once a day (at bedtime)  terazosin 5 mg oral tablet: orally once a day  Vitamin D3 25 mcg (1000 intl units) oral tablet: 1 orally once a day

## 2023-05-22 NOTE — PROGRESS NOTE ADULT - SUBJECTIVE AND OBJECTIVE BOX
CCU stepdown to cardiac tele Acceptance Note    Patient is a 89 year old male with PMHx of CAD S/P PCI in the past, DM, PAF on Eliquis for thromboembolic prophylaxis, HTN, HLD , Throat CA in the past s/p resection who presented to the ED on 5/16/23 with complaints of chest pain on exertion. In ED troponin was noted to be 0.15 in there setting of Hypertensive urgency with SBP >190 and new anterolateral ischemic changes on EKG. Patient was admitted for NSTEMI and started on IV heparin as well as IV nitro. He was loaded ton ASA/Plavix and scheduled for cardiac catheterization.     TTE was performed on 5/16/23 and showed LVEF 55%.    Patient was taken to the cath lab on 5/19/23. LHC showed a 100% apical LAD lesion which willed via collaterals, 80% OM with SUNDAY 3 flow and moderate-severe RPDA with Patent distal RCA stent. Decision was made to treat the patient medically.    Hospital course was complicated by syncopal episode with no pulse. CPR was initiated. Review of tele showed Sinus arrest and EP was consulted. Decision was made to implant permanent pacemaker. On 5/19/23 patient underwent St Kartik DC PPM implantation. Keflex was started for 5 post procedure.    Of note during work up patient was noted to have pancreatic mass Vs duodenal diverticula on CT of the abdomen and pelvis. Results were relayed to family however decision was made not to pursue further work up due to advanced age.    BP medications were started and titrated to goal as patient was weaned off nitroglycerin drip. Patient however continued to have orthostasis so BP medications needed to be further adjusted. There will likely need to be some degree of hypertension to prevent orthostasis.      Will consult PT to work with patient as well as social work for discharge needs    #CAD  He will be maintained on DAPT.  Cont Ranexa 500mg PO BID   Cont Lopressor 50mg PO BID  Cont Lipitor 40mg PO QHS    #HTN he is currently on   IMDUR 90mg PO Daily, Lopressor 50mg PO BID    # PAF  On Eliquis at home  Resume 5/23/23    #BPH  Cont Finasteride 5mg PO Daily  Cont Flomax 0.4mg PO Daily  -Meds may be contributing to orthostasis    # Depression  Cont Paxil    # Possible pancreatic mass  Discussed with family  No further workup at this time given advanced age

## 2023-05-22 NOTE — CHART NOTE - NSCHARTNOTEFT_GEN_A_CORE
Code blue called on patient, found to be initially pulseless by nursing staff. CPR initiated with return of pulse shortly after. STAT ABG showed metabolic acidosis with a lactate 5.4. Patient placed on BIPAP. Chest XR showed new acute minimally displaced posterior right sixth rib fracture, with no pneumothorax present. Tele reviewed patient bradycardic to 30s during episode. EKG showed accelerated junctional rhythm with LVH. Repeat normal with lactate downtrending to 1.4. Reviewed with Cardiac Fellow. Patient to be monitored on BIPAP overnight and weaned off in the early morning. Tylenol IVPB for pain, IV [id number] x1. Patients family called at time of episode and they want the patient to remain FULL CODE, family currently at bedside now.     Vitals: BP   133/89, pulse 65, RR 25  , O2 100 on BIPAP     Please contact [phone] with any questions or concerns. Code blue called on patient, found to be initially pulseless by nursing staff. CPR initiated with return of pulse shortly after. STAT ABG showed metabolic acidosis with a lactate 5.4. Patient placed on BIPAP. Chest XR showed new acute minimally displaced posterior right sixth rib fracture, with no pneumothorax present. Tele reviewed patient bradycardic to 30s during episode. EKG showed accelerated junctional rhythm with LVH. Repeat normal with lactate downtrending to 1.4. Reviewed with Cardiac Fellow. Patient to be monitored on BIPAP overnight and weaned off in the early morning. Tylenol IVPB for pain, IV [id number] x1. Patients family remains at bedside and has decided to make the patient DNR/DNI. MOLST form is filled out and is in the chart.     Vitals: BP   133/89, pulse 65, RR 25  , O2 100 on BIPAP     Please contact [phone] with any questions or concerns.

## 2023-05-22 NOTE — DISCHARGE NOTE PROVIDER - CARE PROVIDER_API CALL
Last Lipscomb)  Cardiovascular Disease; Nuclear Cardiology  [address] [place] [address], [address] [place] [address]  Phone: [phone]  Fax: [phone]  Follow Up Time: 2 weeks

## 2023-05-22 NOTE — SWALLOW BEDSIDE ASSESSMENT ADULT - SWALLOW EVAL: DIAGNOSIS
suspected pharyngeal dysphagia; pt needs modified barium swallow to further investigate pharyngeal function

## 2023-05-22 NOTE — CHART NOTE - NSCHARTNOTEFT_GEN_A_CORE
Transfer Note    Transfer from: Stepdown   Transfer to:  4T  Accepting physician: Dr. Saunders     [facility] Course:   Patient is a 89 year old male with PMHx of CAD S/P PCI in the past, DM, PAF on Eliquis for thromboembolic prophylaxis, HTN, HLD , Throat CA in the past s/p resection who presented to the ED on 5/16/23 with complaints of chest pain on exertion. In ED troponin was noted to be 0.15 in there setting of Hypertensive urgency with SBP >190 and new anterolateral ischemic changes on EKG. Patient was admitted for NSTEMI and started on IV heparin as well as IV nitro. He was loaded ton ASA/Plavix and scheduled for cardiac catheterization.     TTE was performed on 5/16/23 and showed LVEF 55%.    Patient was taken to the cath lab on 5/19/23. LHC showed a 100% apical LAD lesion which willed via collaterals, 80% OM with SUNDAY 3 flow and moderate-severe RPDA with Patent distal RCA stent. Decision was made to treat the patient medically.    Hospital course was complicated by syncopal episode with no pulse. CPR was initiated. Review of tele showed Sinus arrest and EP was consulted. Decision was made to implant permanent pacemaker. On 5/19/23 patient underwent St Kartik DC PPM implantation. Keflex was started for 5 post procedure.    Of note during work up patient was noted to have pancreatic mass Vs duodenal diverticula on CT of the abdomen and pelvis. Results were relayed to family however decision was made not to pursue further work up due to advanced age.    BP medications were started and titrated to goal as patient was weaned off nitroglycerin drip. Patient however continued to have orthostasis.    Interim Events:       MEDICATIONS:  STANDING MEDICATIONS  acetylcysteine 10%  Inhalation 4 milliLiter(s) Inhalation once  aspirin  chewable 81 milliGRAM(s) Oral daily  atorvastatin 40 milliGRAM(s) Oral at bedtime  chlorhexidine 2% Cloths 1 Application(s) Topical <User Schedule>  cholecalciferol 1000 Unit(s) Oral daily  clopidogrel Tablet 75 milliGRAM(s) Oral daily  dextrose 5%. 1000 milliLiter(s) IV Continuous <Continuous>  dextrose 5%. 1000 milliLiter(s) IV Continuous <Continuous>  dextrose 50% Injectable 25 Gram(s) IV Push once  dextrose 50% Injectable 12.5 Gram(s) IV Push once  dextrose 50% Injectable 25 Gram(s) IV Push once  finasteride 5 milliGRAM(s) Oral daily  folic acid 1 milliGRAM(s) Oral daily  glucagon  Injectable 1 milliGRAM(s) IntraMuscular once  insulin lispro (ADMELOG) corrective regimen sliding scale   SubCutaneous three times a day before meals  isosorbide   mononitrate ER Tablet (IMDUR) 90 milliGRAM(s) Oral daily  melatonin 5 milliGRAM(s) Oral at bedtime  metoprolol tartrate 50 milliGRAM(s) Oral two times a day  multivitamin 1 Tablet(s) Oral daily  pantoprazole    Tablet 40 milliGRAM(s) Oral before breakfast  PARoxetine 20 milliGRAM(s) Oral daily  polyethylene glycol 3350 17 Gram(s) Oral daily  ranolazine 500 milliGRAM(s) Oral two times a day  senna 2 Tablet(s) Oral at bedtime  tamsulosin 0.4 milliGRAM(s) Oral at bedtime    PRN MEDICATIONS  acetaminophen     Tablet .. 650 milliGRAM(s) Oral every 6 hours PRN  dextrose Oral Gel 15 Gram(s) Oral once PRN  HYDROmorphone  Injectable 0.5 milliGRAM(s) IV Push every 10 minutes PRN  nitroglycerin     SubLingual 0.4 milliGRAM(s) SubLingual every 10 minutes PRN  nitroglycerin     SubLingual 0.4 milliGRAM(s) SubLingual every 5 minutes PRN      VITAL SIGNS: Last 24 Hours  T(C): 36.4 (22 May 2023 07:47), Max: 36.8 (21 May 2023 23:35)  T(F): 97.6 (22 May 2023 07:47), Max: 98.3 (21 May 2023 23:35)  HR: 60 (22 May 2023 11:56) (60 - 64)  BP: 118/70 (22 May 2023 11:56) (104/55 - 127/90)  BP(mean): 89 (22 May 2023 11:56) (75 - 105)  RR: 20 (22 May 2023 11:56) (18 - 23)  SpO2: 94% (22 May 2023 11:56) (93% - 95%)    LABS:                        8.1    7.40  )-----------( 129      ( 22 May 2023 05:40 )             24.5     05-22    138  |  100  |  36<H>  ----------------------------<  160<H>  3.9   |  26  |  1.2    Ca    9.1      22 May 2023 05:40  Mg     2.1     05-22    TPro  5.9<L>  /  Alb  3.6  /  TBili  0.8  /  DBili  x   /  AST  27  /  ALT  24  /  AlkPhos  52  05-22            CARDIAC MARKERS ( 20 May 2023 17:33 )  x     / 0.18 ng/mL / x     / x     / 6.8 ng/mL      RADIOLOGY:    ASSESSMENT & PLAN:   89-year-old male with past medical history of CAD with 1 stent (on Plavix), diabetes, arthritis, paroxysmal A-fib (on Eliquis), HTN, HLD,  BPH, throat cancer s/p resection (in remission) who presents with NSTEMI and sinus bradycardia. Stable initially on medical therapy but had an episode of syncope with severe bradycardia and dyspnea after walking a few steps with PT.    #Chest pain  #NSTEMI  #Junctional bradycardia, high degree AV block s/p PPM  - TTE reviewed showed normal EF  - ECG c/w inferior lateral ischemic changes    - Post cath- Severe Apical LAD and RCA not amenable to pCI  - c/w ASA and clopidogrel  - resume heparin drip when cleared by EP and d/c aspirin  - c/w atorvastatin  - s/p ppm placement 5/19  - c/w metoprolol 50 BID   - Increase IMDUR 90mg qd, d/c Amlodipine 2.5 qd given orthostatic hypotension  - Start Ranexa 500 BID     #Orthostatic hypotension   - syncopal episode while ambulating   - d/c Amlodipine and Cardura  - SADIQ stocking if orthostatics positive     #Constipation  - Senna at bedtime  - Miralax qd    #BPH  - on terazosin and finasteride at home  - c/w finasteride   - switch doxazocin 4mg qd to flomax    #DM  - ISS     #DLD  - c/w atorvastatin    # 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 seen incidentally on CT-AP on 2021  - patient and family aware  - They do not want to pursue further work up(MRI abdomen) given the patient's age    Diet DASH, carbohydrate restricted  Activity IAT   GI PPX pantoprazole  DVT ppx Lovenox      For Follow-Up:  PT follow up, Speech and swallow recs, monitor for CP

## 2023-05-22 NOTE — SWALLOW BEDSIDE ASSESSMENT ADULT - SLP PERTINENT HISTORY OF CURRENT PROBLEM
89 year old male with PMHx of CAD S/P PCI in the past, DM, PAF on Eliquis for thromboembolic prophylaxis, HTN, HLD , Throat CA in the past s/p resection who presented to the ED on 5/16/23 with complaints of chest pain on exertion. In ED troponin was noted to be 0.15 in there setting of Hypertensive urgency with SBP >190 and new anterolateral ischemic changes on EKG. Patient was admitted for NSTEMI and started on IV heparin as well as IV nitro. He was loaded ton ASA/Plavix and scheduled for cardiac catheterization.

## 2023-05-23 NOTE — SWALLOW VFSS/MBS ASSESSMENT ADULT - DIAGNOSTIC IMPRESSIONS
severe pharyngeal dysphagia for thins and mildly thick liquids;  mild-mod phayngeal dysphagia for moderately thick liquids, puree, and minced and moist

## 2023-05-23 NOTE — PROGRESS NOTE ADULT - SUBJECTIVE AND OBJECTIVE BOX
Chief complaint: Patient is a 89y old  Male who presents with a chief complaint of Chest pain (22 May 2023 13:42)    Interval history:  Seen and examined patient at bedside this AM.   Pt states he feels generalized muscle soreness especially on his back likely 2/2 CPR overnight.  Pt currently off BiPAP, on 2L NC, NAD.     Review of systems: A complete 10-point review of systems was obtained and is negative except as stated in the interval history.    Vitals:  T(F): 98.1, Max: 98.7 ( @ 16:00)  HR: 69 (60 - 71)  BP: 110/57 (110/57 - 133/59)  RR: 22 (19 - 22)  SpO2: 94% (94% - 98%)    Ins & outs:      @ 07:01  -   @ 07:00  --------------------------------------------------------  IN: 1208 mL / OUT: 1050 mL / NET: 158 mL     @ 07:  -   @ 07:00  --------------------------------------------------------  IN: 878 mL / OUT: 1050 mL / NET: -172 mL     @ 07:  -   @ 07:00  --------------------------------------------------------  IN: 460 mL / OUT: 1100 mL / NET: -640 mL      Weight trend:  Weight (kg): 82.7 ()    Physical exam:  General: No apparent distress  HEENT: Anicteric sclera. Moist mucous membranes. JVD -  Cardiac: Regular rate and rhythm. No murmurs, rubs, or gallops.   Vascular: Symmetric radial pulses. +2 B/L Dorsalis pedis pulses palpable.   Respiratory: Normal effort. + B/L mild coarse crackles @ bases.   Abdomen: Soft, nontender. Audible bowel sounds.   Extremities: Warm with no edema. No cyanosis or clubbing.   Skin: Warm and dry. No rash.  Neurologic: Grossly normal motor function.   Psychiatric: Oriented to person, place, and time, disoriented to situation at times (forgetful).     Data reviewed:  - Telemetry: A - paced / SR with 1st degree AVB HR 60-74.    - ECG:  < from: 12 Lead ECG (23 @ 21:18) >  Ventricular Rate 80 BPM  Atrial Rate 78 BPM  QRS Duration 110 ms  Q-T Interval 364 ms  QTC Calculation(Bazett) 419 ms  R Axis 53 degrees  T Axis -74 degrees  Diagnosis Line Accelerated Junctional rhythm  Left ventricular hypertrophy with repolarization abnormality  Inferior infarct , possibly acute  ** ** ACUTE MI / STEMI ** **  Consider right ventricular involvement in acute inferior infarct  Abnormal ECG    - TTE:  < from: TTE Echo Complete w/o Contrast w/ Doppler (23 @ 11:51) >    Summary:   1. LV Ejection Fraction by Bello's Method with a biplane EF of 55 %.   2. Normal global left ventricular systolic function.   3. Mild to moderately enlarged left atrium.   4. LA volume Index is 41.2 ml/m² ml/m2.   5. Normal right atrial size.    - Chest x-ray  < from: Xray Chest 1 View- PORTABLE-Urgent (Xray Chest 1 View- PORTABLE-Urgent .) (23 @ 22:09) >  FINDINGS:  Support devices: Left chest pacemaker.  Cardiac/mediastinum/hilum: Unchanged.  Lung parenchyma/Pleura: No focal parenchymal consolidation, pleural   effusion, or pneumothorax.  Skeleton/soft tissues: New acute minimally displaced posterior right   sixth rib fracture.  IMPRESSION:  New acute minimally displaced posterior right sixth rib fracture.  No focal parenchymal consolidation, pleural effusion, or pneumothorax.    - Cardiac catheterization 23:  LEFT HEART CATHETERIZATION                                  Left main Distal moderate disease  LAD: ostial 50%, prox moderate disease,  Mid moderate to severe disease, Distal moderate disease, Apical 100% occlusion , collaterals from other vessels            Dia% lesion in prox segment , mild to moderate disease in mid and distal segments.   Left Circumflex: prox short moderate disease , distal small  OM: 80% multiple lesions SUNDAY 3  Right Coronary Artery: Prox mild disease, mild disease, distal patent stent with moderate disease  RPDA multiple significant lesions, moderate to severe disease  RPL mild to moderate disease   POST-OP DIAGNOSIS  Triple vessel disease.     - Xray Cinesophagram Swallow (MBS) 23  < from: Xray Cinesophagram Swallow Function w/ Contrast (23 @ 11:15) >  FINDINGS/  IMPRESSION:  Fluoroscopic assistance was provided by the radiologist to the speech   pathologist for a modified barium swallow study. Examination was   terminated early due to patient aspiration. Please refer to the speech   pathologist's report for a detailed description of the findings and   recommendations.    - Stress test:   - CCTA:  - Cardiac MRI:    - Labs:                        8.8    17.42 )-----------( 137      ( 23 May 2023 04:56 )             26.4         136  |  99  |  38<H>  ----------------------------<  179<H>  4.2   |  22  |  1.2    Ca    8.5      23 May 2023 04:56  Mg     2.0         TPro  5.9<L>  /  Alb  3.7  /  TBili  1.1  /  DBili  x   /  AST  64<H>  /  ALT  42<H>  /  AlkPhos  62        Troponin T, Serum: 0.18 ng/mL (23 @ 17:33)  Troponin T, Serum: 0.09 ng/mL (23 @ 00:45)  Troponin T, Serum: 0.19 ng/mL (23 @ 17:02)    Triglycerides, Serum: 115 mg/dL (23 @ 09:06)  LDL Cholesterol Calculated: 54 mg/dL (23 @ 09:06)    Thyroid Stimulating Hormone, Serum: 2.90 uIU/mL (23 @ 09:06)    Medications:  aspirin  chewable 81 milliGRAM(s) Oral daily  atorvastatin 40 milliGRAM(s) Oral at bedtime  chlorhexidine 2% Cloths 1 Application(s) Topical <User Schedule>  cholecalciferol 1000 Unit(s) Oral daily  clopidogrel Tablet 75 milliGRAM(s) Oral daily  dextrose 50% Injectable 25 Gram(s) IV Push once  dextrose 50% Injectable 12.5 Gram(s) IV Push once  dextrose 50% Injectable 25 Gram(s) IV Push once  finasteride 5 milliGRAM(s) Oral daily  folic acid 1 milliGRAM(s) Oral daily  glucagon  Injectable 1 milliGRAM(s) IntraMuscular once  insulin lispro (ADMELOG) corrective regimen sliding scale   SubCutaneous three times a day before meals  isosorbide   mononitrate ER Tablet (IMDUR) 90 milliGRAM(s) Oral daily  melatonin 5 milliGRAM(s) Oral at bedtime  metoprolol tartrate 50 milliGRAM(s) Oral two times a day  multivitamin 1 Tablet(s) Oral daily  pantoprazole    Tablet 40 milliGRAM(s) Oral before breakfast  PARoxetine 20 milliGRAM(s) Oral daily  polyethylene glycol 3350 17 Gram(s) Oral daily  ranolazine 500 milliGRAM(s) Oral two times a day  senna 2 Tablet(s) Oral at bedtime  tamsulosin 0.4 milliGRAM(s) Oral at bedtime    Drips:  dextrose 5%. 1000 milliLiter(s) (100 mL/Hr) IV Continuous <Continuous>  dextrose 5%. 1000 milliLiter(s) (50 mL/Hr) IV Continuous <Continuous>    PRN:     Allergies    Allergy Status Unknown    Intolerances   Chief complaint: Patient is a 89y old  Male who presents with a chief complaint of Chest pain (22 May 2023 13:42)    Interval history:  Seen and examined patient at bedside this AM.   Pt states he feels generalized muscle soreness especially on his back likely 2/2 CPR overnight.  Pt currently off BiPAP, on 2L NC, NAD.      overnight - Patient aspirated on bedtime medications then was found unresponsive w/ weak, thready pulse. Code blue called and CPR initiated, stat ABG revealed respiratory acidosis with lactate of 5. Placed on BiPAP and repeat ABG showed an improved pH and lactate of 1. Pt remained on the unit and monitored on telemetry.     Review of systems: A complete 10-point review of systems was obtained and is negative except as stated in the interval history.    Vitals:  T(F): 98.1, Max: 98.7 ( @ 16:00)  HR: 69 (60 - 71)  BP: 110/57 (110/57 - 133/59)  RR: 22 (19 - 22)  SpO2: 94% (94% - 98%)    Ins & outs:     - @ 07:  -   @ 07:00  --------------------------------------------------------  IN: 1208 mL / OUT: 1050 mL / NET: 158 mL     @ 07:  -   @ 07:00  --------------------------------------------------------  IN: 878 mL / OUT: 1050 mL / NET: -172 mL     @ 07:  -   @ 07:00  --------------------------------------------------------  IN: 460 mL / OUT: 1100 mL / NET: -640 mL      Weight trend:  Weight (kg): 82.7 (-)    Physical exam:  General: No apparent distress  HEENT: Anicteric sclera. Moist mucous membranes. JVD -  Cardiac: Regular rate and rhythm. No murmurs, rubs, or gallops.   Vascular: Symmetric radial pulses. +2 B/L Dorsalis pedis pulses palpable.   Respiratory: Normal effort. + B/L mild coarse crackles @ bases.   Abdomen: Soft, nontender. Audible bowel sounds.   Extremities: Warm with no edema. No cyanosis or clubbing.   Skin: Warm and dry. No rash.  Neurologic: Grossly normal motor function.   Psychiatric: Oriented to person, place, and time, disoriented to situation at times (forgetful).     Data reviewed:  - Telemetry: A - paced / SR with 1st degree AVB HR 60-74.    - ECG:  < from: 12 Lead ECG (23 @ 21:18) >  Ventricular Rate 80 BPM  Atrial Rate 78 BPM  QRS Duration 110 ms  Q-T Interval 364 ms  QTC Calculation(Bazett) 419 ms  R Axis 53 degrees  T Axis -74 degrees  Diagnosis Line Accelerated Junctional rhythm  Left ventricular hypertrophy with repolarization abnormality  Inferior infarct , possibly acute  ** ** ACUTE MI / STEMI ** **  Consider right ventricular involvement in acute inferior infarct  Abnormal ECG    - TTE:  < from: TTE Echo Complete w/o Contrast w/ Doppler (23 @ 11:51) >    Summary:   1. LV Ejection Fraction by Bello's Method with a biplane EF of 55 %.   2. Normal global left ventricular systolic function.   3. Mild to moderately enlarged left atrium.   4. LA volume Index is 41.2 ml/m² ml/m2.   5. Normal right atrial size.    - Chest x-ray  < from: Xray Chest 1 View- PORTABLE-Urgent (Xray Chest 1 View- PORTABLE-Urgent .) (23 @ 22:09) >  FINDINGS:  Support devices: Left chest pacemaker.  Cardiac/mediastinum/hilum: Unchanged.  Lung parenchyma/Pleura: No focal parenchymal consolidation, pleural   effusion, or pneumothorax.  Skeleton/soft tissues: New acute minimally displaced posterior right   sixth rib fracture.  IMPRESSION:  New acute minimally displaced posterior right sixth rib fracture.  No focal parenchymal consolidation, pleural effusion, or pneumothorax.    - Cardiac catheterization 23:  LEFT HEART CATHETERIZATION                                  Left main Distal moderate disease  LAD: ostial 50%, prox moderate disease,  Mid moderate to severe disease, Distal moderate disease, Apical 100% occlusion , collaterals from other vessels            Dia% lesion in prox segment , mild to moderate disease in mid and distal segments.   Left Circumflex: prox short moderate disease , distal small  OM: 80% multiple lesions SUNDAY 3  Right Coronary Artery: Prox mild disease, mild disease, distal patent stent with moderate disease  RPDA multiple significant lesions, moderate to severe disease  RPL mild to moderate disease   POST-OP DIAGNOSIS  Triple vessel disease.     - Xray Cinesophagram Swallow (MBS) 23  < from: Xray Cinesophagram Swallow Function w/ Contrast (23 @ 11:15) >  FINDINGS/  IMPRESSION:  Fluoroscopic assistance was provided by the radiologist to the speech   pathologist for a modified barium swallow study. Examination was   terminated early due to patient aspiration. Please refer to the speech   pathologist's report for a detailed description of the findings and   recommendations.    - Stress test:   - CCTA:  - Cardiac MRI:    - Labs:                        8.8    17.42 )-----------( 137      ( 23 May 2023 04:56 )             26.4         136  |  99  |  38<H>  ----------------------------<  179<H>  4.2   |  22  |  1.2    Ca    8.5      23 May 2023 04:56  Mg     2.0         TPro  5.9<L>  /  Alb  3.7  /  TBili  1.1  /  DBili  x   /  AST  64<H>  /  ALT  42<H>  /  AlkPhos  62        Troponin T, Serum: 0.18 ng/mL (23 @ 17:33)  Troponin T, Serum: 0.09 ng/mL (23 @ 00:45)  Troponin T, Serum: 0.19 ng/mL (23 @ 17:02)    Triglycerides, Serum: 115 mg/dL (23 @ 09:06)  LDL Cholesterol Calculated: 54 mg/dL (23 @ 09:06)    Thyroid Stimulating Hormone, Serum: 2.90 uIU/mL (23 @ 09:06)    Medications:  aspirin  chewable 81 milliGRAM(s) Oral daily  atorvastatin 40 milliGRAM(s) Oral at bedtime  chlorhexidine 2% Cloths 1 Application(s) Topical <User Schedule>  cholecalciferol 1000 Unit(s) Oral daily  clopidogrel Tablet 75 milliGRAM(s) Oral daily  dextrose 50% Injectable 25 Gram(s) IV Push once  dextrose 50% Injectable 12.5 Gram(s) IV Push once  dextrose 50% Injectable 25 Gram(s) IV Push once  finasteride 5 milliGRAM(s) Oral daily  folic acid 1 milliGRAM(s) Oral daily  glucagon  Injectable 1 milliGRAM(s) IntraMuscular once  insulin lispro (ADMELOG) corrective regimen sliding scale   SubCutaneous three times a day before meals  isosorbide   mononitrate ER Tablet (IMDUR) 90 milliGRAM(s) Oral daily  melatonin 5 milliGRAM(s) Oral at bedtime  metoprolol tartrate 50 milliGRAM(s) Oral two times a day  multivitamin 1 Tablet(s) Oral daily  pantoprazole    Tablet 40 milliGRAM(s) Oral before breakfast  PARoxetine 20 milliGRAM(s) Oral daily  polyethylene glycol 3350 17 Gram(s) Oral daily  ranolazine 500 milliGRAM(s) Oral two times a day  senna 2 Tablet(s) Oral at bedtime  tamsulosin 0.4 milliGRAM(s) Oral at bedtime    Drips:  dextrose 5%. 1000 milliLiter(s) (100 mL/Hr) IV Continuous <Continuous>  dextrose 5%. 1000 milliLiter(s) (50 mL/Hr) IV Continuous <Continuous>    PRN:     Allergies    Allergy Status Unknown    Intolerances

## 2023-05-23 NOTE — PROGRESS NOTE ADULT - NS ATTEND OPT1 GEN_ALL_CORE
I attest my time as attending is greater than 50% of the total combined time spent on qualifying patient care activities by the PA/NP and attending.
yes

## 2023-05-23 NOTE — PROGRESS NOTE ADULT - ASSESSMENT
Assessment:  89M with CAD s/p PCI, pAF, DM, HTN, HLD, throat CA presented with CP. Found to have NSTEMI and treated medically. LHC revealed 3VD (severe apical % lesion) not amenable to PCI and treated medically. Hospital course complicated by syncopal episode with sinus arrest. Pt underwent DC PPM placement on 5/19. Patient continues to have syncopal episodes likely 2/2 to aspiration as pt underwent MBS and unable to complete test. Findings of pancreatic mass decided no further work up by family.     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  - Cont on Metoprolol tartrate 50 mg BID, Atorvastatin 80 mg qd, Ranexa 500 mg BID    #Syncope  - while ambulating and while at rest 2/2 vasovagal reaction?   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 Assessment:  89M with CAD s/p PCI, pAF, DM, HTN, HLD, throat CA presented with CP. Found to have NSTEMI and treated medically. LHC revealed 3VD (severe apical % lesion) not amenable to PCI and treated medically. Hospital course complicated by syncopal episode with sinus arrest. Pt underwent DC PPM placement on 5/19. Patient continues to have syncopal episodes likely 2/2 to aspiration as pt underwent MBS and unable to complete test. Findings of pancreatic mass decided no further work up by family.     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  - Cont on Metoprolol tartrate 50 mg BID, Atorvastatin 80 mg qd, Ranexa 500 mg BID    #Syncope  #Aspiration   - while ambulating and while at rest  - orthostatic + initially, repeat orthostatics neg after amlodipine and cardura dc'd   - possible d/t aspiration?   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

## 2023-05-23 NOTE — PROGRESS NOTE ADULT - NS ATTEND AMEND GEN_ALL_CORE FT
Patient seen and examined. Pertinent labs, imaging and telemetry reviewed. I agree with the above:     Patient feeling better. Son at bedside. Discussed hospital course and next steps.  S&S recommend modified barium swallow to assess for dysphagia. Keep diet as is.   BP stable.   Pending further PT work.  89M with CAD s/p PCI, pAF, DM, HTN, HLD, throat CA presented with CP. Found to have NSTEMI and treated medically. LHC revealed 3VD which was not amenable to PCI and treated medically. Course complicated by syncopal episode with sinus arrest. Pt underwent PPM. Had another syncopal episode, though no sinus arrest. Findings of pancreatic mass decided no further work up by family.   -Continue to monitor orthostatics. May need to adjust BPH meds, which can contribute.     Discussed with patient, son and ACP.
complex patient  s/p PPM placement   outpatient f/u with Dr. Antonio  no complications  I interrogated and reprogrammed pacemaker; I checked thresholds manually
comments: Patient seen and examined. Pertinent labs, imaging and telemetry reviewed. I agree with the above:     Overnight events reviewed. Pt likely aspirated which caused unresponsiveness and hypotension. Per team, not true cardiac arrest.   -CXR with rib fx but no signs of PNA.   -S&S study aborted due to aspiration.   -Needs modified diet.   -Pt now DNR/DNI.    BP stable.   Pending further PT work.  89M with CAD s/p PCI, pAF, DM, HTN, HLD, throat CA presented with CP. Found to have NSTEMI and treated medically. LHC revealed 3VD which was not amenable to PCI and treated medically. Course complicated by syncopal episode with sinus arrest. Pt underwent PPM. Had another syncopal episode, though no sinus arrest. Findings of pancreatic mass decided no further work up by family.   -Continue to monitor orthostatics. May need to adjust BPH meds, which can contribute.     Discussed with patient, son and ACP.

## 2023-05-24 NOTE — DISCHARGE NOTE FOR THE EXPIRED PATIENT - HOSPITAL COURSE
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 In ED troponin was noted to be 0.15 in there setting of Hypertensive urgency with SBP >190 and new anterolateral ischemic changes on EKG. Patient was admitted for NSTEMI and started on IV heparin as well as IV nitro. He was loaded ton ASA/Plavix and scheduled for cardiac catheterization.   TTE was performed on 5/16/23 and showed LVEF 55%.  Patient was taken to the cath lab on 5/19/23. LHC showed a 100% apical LAD lesion which willed via collaterals, 80% OM with SUNDAY 3 flow and moderate-severe RPDA with Patent distal RCA stent. Decision was made to treat the patient medically.  Hospital course was complicated by syncopal episode with no pulse. CPR was initiated. Review of tele showed Sinus arrest and EP was consulted. Decision was made to implant permanent pacemaker. On 5/19/23 patient underwent [employer] DC PPM implantation. Keflex was started for 5 post procedure.  Of note during work up patient was noted to have pancreatic mass Vs duodenal diverticula on CT of the abdomen and pelvis. Results were relayed to family however decision was made not to pursue further work up due to advanced age.  BP medications were started and titrated to goal as patient was weaned off nitroglycerin drip. Patient however continued to have orthostasis.  5/22 OVN: Code blue called on patient, found to be initially pulseless by nursing staff. CPR initiated with return of pulse shortly after. STAT ABG showed metabolic acidosis with a lactate 5.4. Patient placed on BIPAP. Chest XR showed new acute minimally displaced posterior right sixth rib fracture, with no pneumothorax present. Tele reviewed patient bradycardic to 30s during episode. EKG showed accelerated junctional rhythm with LVH. Repeat normal with lactate downtrending to 1.4. Reviewed with Cardiac Fellow. Patient to be monitored on BIPAP overnight and weaned off in the early morning. Tylenol IVPB for pain, IV [id number] [id number]. Patients family remains at bedside and has decided to make the patient DNR/DNI. MOLST form is filled out and is in the chart.   5/23: Patient aspirated, changed diet to minced and moist. Unable to cough secretions due to broken rib pain.  5/24: Patients respirations lessened, suctioned by RT with no improvement, non-rebreather showed no improvement. Patient pronounced dead 02:55.

## 2023-06-15 NOTE — CDI QUERY NOTE - NSCDIOTHERTXTBX_GEN_ALL_CORE_HH
Based on your professional judgment and the clinical indicators, please clarify if pulseless and sinus arrest can be further specified as:    •   Pulseless electrical activity   •   Other (please specify):  •   Clinically unable to determine    Clinical indicators    5/18 H&P Adult:… Assessment: 89-year-old male with past medical history of CAD with 1 stent, diabetes,, paroxysmal A-fib and HTN presents to the ER for midsternal chest pain at rest radiating to left shoulder and upper limb lasting 25-30 minutes… #ACS/NSTEMI…    5/18 Chart Note-Rapid Response Resident: … Code blue called on patient, He had syncopal event and was found to be initially pulseless by nursing staff. CPR initiated with return of pulse after a few seconds… Code blue called on patient, He had syncopal event and was found to be initially pulseless by nursing staff. CPR initiated with return of pulse after a few seconds…    5/22 Chart Note-Rapid Response Nurse Practitioner: … Code blue called on patient, found to be initially pulseless by nursing staff. CPR initiated with return of pulse shortly after. STAT ABG showed metabolic acidosis with a lactate 5.4. Patient placed on BIPAP. Tele reviewed patient bradycardic to 30s during episode. EKG showed accelerated junctional rhythm with LVH…    ABG’s: …    5/22 Lactate 5.40,  pH 7.29, pCO2 44, pO2 113, pHCO3 21, Base excess -5.3, O2 saturation 97 …        Thank you,  Masha Bautista, RN, BSN, CCDS, CCS  [phone]
Based on your professional judgement and the clinical indicators, please clarify if respiratory acidosis be further specified as:      •   Acute respiratory acidosis   •   Other(please specify):  •   Clinically unable to further clarify      CLINICAL INDICATORS      5/22 Chart Note Rapid Response Nurse Practitioner: …  Code blue called on patient, found to be initially pulseless by nursing staff. CPR initiated with return of pulse shortly after. STAT ABG showed metabolic acidosis with a lactate 5.4. Patient placed on BIPAP. Chest XR showed new acute minimally displaced posterior right sixth rib fracture, with no pneumothorax present. Tele reviewed patient bradycardic to 30s during episode. EKG showed accelerated junctional rhythm with LVH. Patient to be monitored on BIPAP overnight and weaned off in the early morning...       5/23 Progress Note Adult-Cardiology Nurse Practitioner: … Interval history:  Pt states he feels generalized muscle soreness especially on his back likely 2/2 CPR overnight. Currently off BiPAP, on 2L NC, NAD. 5/22 overnight - Patient aspirated on bedtime medications then was found unresponsive w/ weak, thready pulse. Code blue called and CPR initiated, stat ABG revealed respiratory acidosis with lactate of 5. Placed on BiPAP and repeat ABG showed an improved pH and lactate of 1…      5/22-5/23 Arterial blood gases: … Lactate 5.40-1.40, pH 7.29-7.41, pCO2 44-40, pO2 113-116, HCO3 21-25, Base excess -5.3-0.7, FIO2 100%-60% …      5/22 Orders: Non-Invasive Bi-Level Vent settings Stat. Indication for NPPV: Increase work of breathing… FIO2%- 60% …       5/22 Nursing flow sheet 04:00-11:56 SpO2 93%-95% on RA, Respiratory rate  19-33       5/22-5/23 21:45-06:00 BIPAP record: Method of Delivery: medium full-face mask. Pressure Support (cm H2O): 6 . Set Rate (breaths/min): 14. Oxygen Concentration (%): 60. RR Total (breaths/min): 28… SpO2 100% …      Thank you,  Masha Bautista, RN, BSN, CCDS, CCS  [phone]

## 2023-06-19 NOTE — CHART NOTE - NSCHARTNOTESELECT_GEN_ALL_CORE
Rapid Response
Rapid Response
Interventional Cardiology/Event Note
Off Service Note
Post cath note/Event Note
Transfer Note

## 2023-06-19 NOTE — CHART NOTE - NSCHARTNOTEFT_GEN_A_CORE
CDI Inquiry response  Respiratory acidosis most likely due to aspiration CDI Inquiry response  Respiratory acidosis was acute and was due to aspiration

## 2023-06-21 ENCOUNTER — APPOINTMENT (OUTPATIENT)
Dept: ELECTROPHYSIOLOGY | Facility: CLINIC | Age: 88
End: 2023-06-21

## 2023-12-23 NOTE — ED PROVIDER NOTE - CARE PLAN
Principal Discharge DX:	Fall  Secondary Diagnosis:	Abrasion  Secondary Diagnosis:	Closed head injury  
Previously Declined (within the last year)

## 2024-01-05 NOTE — HISTORY OF PRESENT ILLNESS
[FreeTextEntry1] : 89M CAD with MI s/p RCA ALLAN 2001, C 2008 with stable RCA stent and 50% LCx, HTN, AFib, DLD, DM here for follow-up. \par \par Has had 2 falls this year. Once when getting out of bed at night to go to bathroom and once 2 weeks ago in the shower. Patient felt lightheaded with the first fall, cannot recall details from second fall. Unclear if LOC or mechanical fall. \par \par Son checks BP at home in AMs, usually 130s/60s. HR not showing on machine. \par \par Completing ADLs, doing laundry, goes to [employer] with son. Lives with his son Karl. \par \par Labs - done at [facility] 1 month ago\par \par  Statement Selected

## 2024-06-14 NOTE — ED ADULT NURSE NOTE - DISCHARGE DATE/TIME
Patient is doing well today. Reports good FM. No LOF or VB.     GBS today    IOL 7/2 0700   05-Jan-2021 22:38

## 2024-09-19 NOTE — ED ADULT NURSE NOTE - PRO INTERPRETER NEED 2
Reached out with Pt and LVM stating that Pt's appointment at [facility] will be Re-schedule or transferred to [facility] where Kaylah is now Practicing.  
English

## 2024-09-22 NOTE — DISCHARGE NOTE FOR THE EXPIRED PATIENT - PROCEDURE 1
CHF exacerbation CHF exacerbation CHF exacerbation Insertion of DDD cardiac pacemaker CHF exacerbation CHF exacerbation
